# Patient Record
Sex: MALE | Race: ASIAN | NOT HISPANIC OR LATINO | Employment: FULL TIME | URBAN - METROPOLITAN AREA
[De-identification: names, ages, dates, MRNs, and addresses within clinical notes are randomized per-mention and may not be internally consistent; named-entity substitution may affect disease eponyms.]

---

## 2021-12-09 ENCOUNTER — OFFICE VISIT (OUTPATIENT)
Dept: URGENT CARE | Facility: URGENT CARE | Age: 31
End: 2021-12-09
Payer: OTHER GOVERNMENT

## 2021-12-09 ENCOUNTER — ANCILLARY PROCEDURE (OUTPATIENT)
Dept: GENERAL RADIOLOGY | Facility: CLINIC | Age: 31
End: 2021-12-09
Attending: FAMILY MEDICINE
Payer: OTHER GOVERNMENT

## 2021-12-09 VITALS
HEART RATE: 57 BPM | DIASTOLIC BLOOD PRESSURE: 80 MMHG | OXYGEN SATURATION: 99 % | SYSTOLIC BLOOD PRESSURE: 126 MMHG | WEIGHT: 146.2 LBS | TEMPERATURE: 97.9 F

## 2021-12-09 DIAGNOSIS — M25.512 ACUTE PAIN OF LEFT SHOULDER: ICD-10-CM

## 2021-12-09 DIAGNOSIS — S49.92XA INJURY OF LEFT ACROMIOCLAVICULAR JOINT, INITIAL ENCOUNTER: ICD-10-CM

## 2021-12-09 DIAGNOSIS — R07.81 RIB PAIN ON LEFT SIDE: ICD-10-CM

## 2021-12-09 DIAGNOSIS — M25.512 ACUTE PAIN OF LEFT SHOULDER: Primary | ICD-10-CM

## 2021-12-09 PROCEDURE — 71101 X-RAY EXAM UNILAT RIBS/CHEST: CPT | Mod: LT | Performed by: RADIOLOGY

## 2021-12-09 PROCEDURE — 73030 X-RAY EXAM OF SHOULDER: CPT | Mod: LT | Performed by: RADIOLOGY

## 2021-12-09 PROCEDURE — 99204 OFFICE O/P NEW MOD 45 MIN: CPT | Performed by: FAMILY MEDICINE

## 2021-12-09 NOTE — LETTER
[facility] [place]  [address] [place] [address]  Phone: [phone]    December 9, 2021        Chance Wilson  [address] [facility] [address]          To whom it may concern:    RE: Chance Wilson    Patient was seen and treated today at our clinic.  Patient has a left shoulder AC joint injury  Please restrict from activities that require left upper arm strength and stability.  No pushing pulling lifting with the left hand    Restrictions until re-evaluated by orthopedics.     Please contact me for questions or concerns.      Sincerely,        Kathia Ndiaye MD

## 2021-12-09 NOTE — PROGRESS NOTES
Chief complaint: left shoulder     Last weekend   Snow boarding [place]  Was going fast on the hill and caught an edge   And landed directly on his left shoulder     Then landed on it again    Initially was so bad that he could hardly move it  Had numbness and pain   He could move it limited but certain things still hurt     Getting a bit better     head injury:No  loss of consciousness:  No  syncope or presyncope: No  chest pain or palpitation: No  mechanical fall:  Yes  using assistive devices:  No  blood thinners: No  denies headache  denies any nausea or vomiting  denies any amnesia, confusion or concussion symptoms  denies any blurring of vision  denies any otorrhea or rhinorhea  denies any neck pain  denies any back pain.  denies any joint pain except noted above.  denies any bowel or bladder incontinence or motor or sensory deficits.  denies any abdominal pain, nausea or vomiting or flank pain  denies any hematuria         No Known Allergies    No past medical history on file.    No current outpatient medications on file prior to visit.  No current facility-administered medications on file prior to visit.      Social History     Tobacco Use     Smoking status: Not on file     Smokeless tobacco: Not on file   Substance Use Topics     Alcohol use: Not on file     Drug use: Not on file       ROS:  review of systems negative except for noted above.       OBJECTIVE:  /80   Pulse 57   Temp 97.9  F (36.6  C) (Tympanic)   Wt 66.3 kg (146 lb 3.2 oz)   SpO2 99%    General:   awake, alert, and cooperative.  NAD.   Head: Normocephalic, atraumatic.  Eyes: Conjunctiva clear,   Heart: Regular rate and rhythm. No murmur.  Lungs: Chest is clear; no wheezes or rales.   Abdomen: soft non-tender.  Neuro: Alert and oriented - normal speech.  MS: Using extremities freely  No erythema warmth or swelling  AC joint tenderness: positive left   Bicipital groove tenderness: negative   Speed's test: negative   Cross arm test:  POSITIVE   Active range of motion: LIMITED WITH PAIN ABDUCTION ABOVE SHOULDER LEVEL AND FORWARD FLEXION ABOVE SHOULDER LEVEL   Passive range of motion: INTACT   Motor intact sensory intact.   PSYCH:  Normal affect, normal speech  SKIN: no obvious rashes    Diagnostic Test Results:  No results found for this or any previous visit (from the past 24 hour(s)).      ASSESSMENT:    ICD-10-CM    1. Acute pain of left shoulder  M25.512 XR Shoulder Left G/E 3 Views     XR Ribs & Chest Lt 3v   2. Rib pain on left side  R07.81 XR Shoulder Left G/E 3 Views     XR Ribs & Chest Lt 3v   3. Injury of left acromioclavicular joint, initial encounter  S49.92XA Orthopedic  Referral       PLAN:   Xrays to my personal review negative. Prelim negative as well for fracture   Chest xray no rib fracture no pneumothorax    Supportive treatment   activiy modification  See AVS  Referred to orthopedics for follow up   Patient declined sling       Advised about symptoms which might herald more serious problems.        Kathia Ndiaye MD

## 2021-12-09 NOTE — PATIENT INSTRUCTIONS
Patient Education     AC Joint Sprain (Adult)    The AC or acromioclavicular joint is at the end of the collar bone, or clavicle, near the shoulder. The AC joint is made of 4 ligaments that hold the collar bone to the shoulder blade, or scapula. With an AC joint sprain, these ligaments may be partly or fully torn. In both cases, this causes pain and swelling at the end of the collar bone. If the ligaments are completely torn, the collar bone will rise up.  AC joint sprains are given a grade depending on whether they are mild, moderate, or severe:    Grade 1. A mild sprain, with minor damage to the ligaments. The collar bone stays in place.    Grade 2. A moderate sprain. The ligaments are partly torn. The collar bone is moved out of place. The injured shoulder may look lower and flatter than the other shoulder.    Grade 3. The most severe kind of sprain. The ligaments are completely torn. The collar bone is no longer joined to the shoulder blade. The collar bone rises up. This creates a bump on top of the shoulder. The ligaments heal in this position, so the bump does not go away. It is possible to have surgery to correct the bump. But normal shoulder function will usually return even without surgery.  An AC sprain will take up to 6 weeks or longer to heal, depending on how severe it is. It is often treated with a sling. Or a sling and an elastic wrap around the chest may be used. Physical therapy may be needed to help the shoulder keep full range of motion. Once healed, you can usually expect full recovery of shoulder function.  Home care    Your provider may prescribe medicines for pain. Or you may use over-the-counter pain medicines. Talk with your provider before taking medicines if you have chronic liver or kidney disease, or have ever had a stomach ulcer or gastrointestinal bleeding.    Make an appointment right away to see your provider or an orthopedic or bone doctor, for further evaluation and treatment of  the injury.    Use the injured area as little as possible. This will help decrease pain and swelling and allow the area to heal.    Place an ice pack over the injured area for 15 minutes. Do this every 4 to 6 hours for the first 24 to 48 hours, or as directed. Keep using ice packs to ease pain and swelling as directed by your provider or the orthopedic doctor.    To make an ice pack, put ice cubes in a plastic bag that seals at the top. Wrap the bag in a clean, thin towel or cloth. Never put ice or an ice pack directly on the skin. The ice pack can be put right on the wrap or sling. As the ice melts, be careful to not get the wrap or sling wet.    A sling alone is often enough. Sometime a sling and a wrap around the chest may be used to help ease pain, if needed. This also helps keep your injured arm from moving. Use these devices as advised until you are seen by your healthcare provider or the orthopedic doctor. Always ask when the wrap should be worn. Always ask when the wrap can be removed.    Wear the sling while awake or as advised, until you are scheduled to see your healthcare provider or an orthopedic doctor. You may remove the sling to sleep. You may remove the sling to bathe. Make sure the sling is comfortable and keeps your arm raised, as advised by the provider. Follow the provider s instructions on how to use the sling. Always ask when you should wear the sling. Always ask when the sling can be removed.    Shoulder joints become stiff if they are kept still for too long. Talk with your healthcare provider or the orthopedic doctor about range of motion exercises and possible physical therapy.  Follow-up care  Follow up with your healthcare provider, or as advised. Your provider may refer you to a specialist such as an orthopedic, or bone, doctor.  If X-rays were taken, you will be told of any new findings that may affect your care.  When to seek medical advice  Call your healthcare provider right away if  any of these occur:    Your shoulder looks off-balance    You have increased pain, swelling, or bruising    You have increased pain even after using prescribed pain medicine    You have continuing pain     Your hand or arm becomes cold, blue, numb, or tingly    You have trouble moving your shoulder, wrist, or elbow due to stiffness  Laura last reviewed this educational content on 5/1/2018 2000-2021 [employer]. All rights reserved. This information is not intended as a substitute for professional medical care. Always follow your healthcare professional's instructions.           Patient Education     Rib Contusion or Minor Fracture    A rib contusion is a bruise to one or more rib bones. It may cause pain, tenderness, swelling, and a purplish color to the skin. There may be a sharp pain with each breath. A rib contusion takes anywhere from a few days to a few weeks to heal. A minor rib fracture or break may cause the same symptoms as a rib contusion. The small crack may not be seen on a regular chest X-ray. Treatment for both problems is basically the same.  Home care    You may use over-the-counter pain medicine to control pain, unless another pain medicine was prescribed. If you have chronic liver or kidney disease or ever had a stomach ulcer or GI (gastrointestinal) bleeding, talk with your healthcare provider before using these medicines.    Rest. Don't lift anything heavy or do any activity that causes pain.    Apply an ice pack over the injured area for 15 to 20 minutes every 1 to 2 hours. You should do this for the first 24 to 48 hours. To make an ice pack, put ice cubes in a plastic bag that seals at the top. Wrap the bag in a clean, thin towel or cloth. Never put ice or an ice pack directly on the skin. Continue with ice packs as needed for the relief of pain and swelling.    The first 3 to 4 weeks of healing will be the most painful. If your pain is not under control with the treatment  given, call your healthcare provider. Sometimes a stronger pain medicine may be needed. A nerve block can be done in case of severe pain. It will numb the nerve between the ribs.  Follow-up care  Follow up with your healthcare provider, or as advised.  If X-rays were taken, you will be told of any new findings that may affect your care.  Call 911  Call 911 if you have:    Dizziness, weakness or fainting    Shortness of breath with or without chest discomfort    New or worsening pain  When to seek medical advice  Call your healthcare provider right away if any of these occur:    Fever of 100.4 F (38 C) or higher, or as directed by your healthcare provider    Chills    Stomach pain, vomiting  Zesty last reviewed this educational content on 6/1/2018 2000-2021 [employer]. All rights reserved. This information is not intended as a substitute for professional medical care. Always follow your healthcare professional's instructions.

## 2021-12-16 NOTE — PROGRESS NOTES
SUBJECTIVE:   CC: Chance Wilson is an 31 year old male who presents for preventative health visit.       Patient has been advised of split billing requirements and indicates understanding: Yes  Healthy Habits:     Getting at least 3 servings of Calcium per day:  Yes    Bi-annual eye exam:  Yes    Dental care twice a year:  Yes    Sleep apnea or symptoms of sleep apnea:  None    Diet:  Regular (no restrictions)    Frequency of exercise:  4-5 days/week    Duration of exercise:  Greater than 60 minutes    Taking medications regularly:  Yes    PHQ-2 Total Score: 0    Additional concerns today:  Yes              Today's PHQ-2 Score: No flowsheet data found.    Abuse: Current or Past(Physical, Sexual or Emotional)- No  Do you feel safe in your environment? Yes    Have you ever done Advance Care Planning? (For example, a Health Directive, POLST, or a discussion with a medical provider or your loved ones about your wishes): Yes, patient states has an Advance Care Planning document and will bring a copy to the clinic.    Social History     Tobacco Use     Smoking status: Not on file     Smokeless tobacco: Not on file   Substance Use Topics     Alcohol use: Not on file     If you drink alcohol do you typically have >3 drinks per day or >7 drinks per week? No    No flowsheet data found.    Last PSA: No results found for: PSA    Reviewed orders with patient. Reviewed health maintenance and updated orders accordingly -       Reviewed and updated as needed this visit by clinical staff                Reviewed and updated as needed this visit by Provider                   Review of Systems   Constitutional: Negative for chills and fever.   HENT: Negative for congestion, ear pain, hearing loss and sore throat.    Eyes: Negative for pain and visual disturbance.   Respiratory: Negative for cough and shortness of breath.    Cardiovascular: Negative for chest pain, palpitations and peripheral edema.   Gastrointestinal: Negative for  abdominal pain, constipation, diarrhea, heartburn, hematochezia and nausea.   Genitourinary: Negative for dysuria, frequency, genital sores, hematuria and urgency.   Musculoskeletal: Negative for arthralgias, joint swelling and myalgias.   Skin: Negative for rash.   Neurological: Negative for dizziness, weakness, headaches and paresthesias.   Psychiatric/Behavioral: Negative for mood changes. The patient is not nervous/anxious.          OBJECTIVE:   There were no vitals taken for this visit.    Physical Exam          ASSESSMENT/PLAN:       ICD-10-CM    1. Routine general medical examination at a health care facility  Z00.00    2. Need for vaccination  Z23    3. Encounter for hepatitis C screening test for low risk patient  Z11.59 Hepatitis C Screen Reflex to HCV RNA Quant and Genotype     Hepatitis C Screen Reflex to HCV RNA Quant and Genotype   4. Screening for diabetes mellitus  Z13.1 Comprehensive metabolic panel (BMP + Alb, Alk Phos, ALT, AST, Total. Bili, TP)     Comprehensive metabolic panel (BMP + Alb, Alk Phos, ALT, AST, Total. Bili, TP)   5. CARDIOVASCULAR SCREENING; LDL GOAL LESS THAN 160  Z13.6 Lipid panel reflex to direct LDL Fasting     Lipid panel reflex to direct LDL Fasting   6. Personal history of smoking  Z87.891 Pneumococcal vaccine 23 valent PPSV23  (Pneumovax) [44103]   7. Adopted person  Z02.82    8. Family history unknown  Z78.9        Patient has been advised of split billing requirements and indicates understanding: Yes  COUNSELING:   Reviewed preventive health counseling, as reflected in patient instructions       Regular exercise       Healthy diet/nutrition       Vision screening       Family planning       Consider Hep C screening for all patients one time for ages 18-79 years       HIV screeninx in teen years, 1x in adult years, and at intervals if high risk       Osteoporosis prevention/bone health       One time pneumovax for smokers    There is no height or weight on file to  calculate BMI.         He has no history on file for tobacco use.      Counseling Resources:  ATP IV Guidelines  Pooled Cohorts Equation Calculator  FRAX Risk Assessment  ICSI Preventive Guidelines  Dietary Guidelines for Americans, 2010  USDA's MyPlate  ASA Prophylaxis  Lung CA Screening    Fred Patel MD  [facility]  --------------------------------------------------------------------------------------------------------------------------------------  SUBJECTIVE:  Chance Wilson is a 31 year old male who presents to the clinic today for a routine physical exam.    The patient's last physical was many  years ago.     No results found for: CHOL  No results found for: HDL  No results found for: LDL  No results found for: TRIG  No results found for: CHOLHDLRATIO  The patient's last fasting lipid panel was done many  years ago and the results are unknown to the patient.        The ASCVD Risk score (Ails SHANTEL Jr., et al., 2013) failed to calculate for the following reasons:    The 2013 ASCVD risk score is only valid for ages 40 to 79      Risk Enhancers:  Family history of premature ASCVD- unknown  LDL >159- unknown  Chronic kidney disease- No  Metabolic Syndrome- No  Premature menopause- No  Inflammatory conditions (RA, psoriasis, HIV)- No  SE  Ancestry- No  Triglycerides >174- Unknown      The patient reports that he has never been treated for high blood pressure.    The patient reports that he does not take a daily aspirin.    No results found for: HCVAB  The patient reports that he has not been screened for Hepatitis C    (Screen all baby boomers once per CDC-- the generation born from 1945 through 1965 and per USPTF screen age 19 to 79 especially younger people who have used IV drugs)  He would like to have an Hepatitis C test today    No results found for: HIAGAB  The patient reports that he has not been screened for HIV   (Screen all 15 to 64 years old)  He would not like to have an  HIV test today      Immunization History   Administered Date(s) Administered     Anthrax 06/16/2020, 11/07/2020     COVID-19,PF,Ashok 03/29/2021     FLU 6-35 months 10/19/2012, 11/23/2013, 10/19/2014, 10/24/2015     Flu, Unspecified 10/29/2011, 10/11/2018     [id number] Novel Flu 06/01/2010     HepA-Adult 06/05/2011     HepB-Adult 06/05/2011     Influenza Vaccine IM > 6 months Valent IIV4 (Alfuria,Fluzone) 10/20/2016, 01/11/2020, 10/20/2021     Influenza Vaccine, 6+MO IM (QUADRIVALENT W/PRESERVATIVES) 10/20/2020     MMR 06/01/2010, 08/20/2010     Mantoux Tuberculin Skin Test 05/26/2010     Meningococcal (Menactra ) 05/26/2010     Meningococcal (Menveo ) 03/19/2020     Poliovirus, inactivated (IPV) 05/26/2010     Tdap (Adacel,Boostrix) 05/26/2010, 03/19/2020     Twinrix A/B 06/01/2010, 08/20/2010     Typhoid IM 06/16/2020     The patient's believes that his last tetanus shot was given 1 year(s) ago.   The patient believes that he has not had a Shingrix in the past  The patient believes that he has not had a PPSV23 in the past.  The patient believes that he has not had a PCV13 in the past.  The patient believes that he has had a seasonal flu vaccination this fall or winter.  The patient would like to have a PPSV23      No results found for this or any previous visit.]   The patient denies a family history of colon cancer.  The patient reports that he has not had a colonoscopy.    The patient reports that he does not performs a self testicular exam monthly.  His currently used contraception is as his wife is currently pregnant. He is not interested in a vasectomy in the near future.  The patient is unsure of  a family history of diabetes.  The patient reports that he eats or drinks 3 servings of dairy products per day.  The patient reports that he has dental appointments approximately every 6 months.  The patient denies any visual difficulty.    Do you currently smoke? No, quit in 2015   How many years have you smoked?     How many packs per day did you smoke on average? N/A  (if more than 30 pack year history and the patient is age 55-80 consider ordering an annual low dose radiation lung CT to screen for cancer)  (Do not order if patient has quit more than 15 years ago or has a health condition that limits life expectancy or could not tolerate curative lung surgery)  Are you interested having a lung CT to screen for lung cancer? N/A    If the patient has smoked more that 100 cigarettes, has the patient had an imaging study (US or CT) for an AAA between the ages of 65 and 75? N/A              There is no problem list on file for this patient.      History reviewed. No pertinent surgical history.    Family History   Problem Relation Age of Onset     Cancer No family hx of      Diabetes No family hx of      Coronary Artery Disease No family hx of      Hypertension No family hx of      Obesity No family hx of      Heart Disease No family hx of        Social History     Socioeconomic History     Marital status:      Spouse name: Not on file     Number of children: Not on file     Years of education: Not on file     Highest education level: Not on file   Occupational History     Not on file   Tobacco Use     Smoking status: Never Smoker     Smokeless tobacco: Never Used   Vaping Use     Vaping Use: Every day   Substance and Sexual Activity     Alcohol use: Yes     Drug use: Never     Sexual activity: Yes   Other Topics Concern     Not on file   Social History Narrative     Not on file     Social Determinants of Health     Financial Resource Strain: Not on file   Food Insecurity: Not on file   Transportation Needs: Not on file   Physical Activity: Not on file   Stress: Not on file   Social Connections: Not on file   Intimate Partner Violence: Not on file   Housing Stability: Not on file       No current outpatient medications on file.     ROS:   The patient has had bilateral(ly) subareolar swelling since age 17.  It has become  "painful in the last year or 2.  He is interested in having this treated      PHYSICAL EXAMINATION:  Blood pressure 122/74, pulse 53, temperature 97.1  F (36.2  C), temperature source Tympanic, resp. rate 14, height 1.651 m (5' 5\"), weight 66.2 kg (146 lb), SpO2 100 %.  General appearance - healthy, alert and no distress  Skin - Skin color, texture, turgor normal. No rashes or lesions.  Head - Normocephalic. No masses, lesions, tenderness or abnormalities  Eyes - conjunctivae/corneas clear. PERRL, EOM's intact. Fundi benign  Ears - External ears normal. Canals clear. TM's normal.  Nose/Sinuses - Nares normal. Septum midline. Mucosa normal. No drainage or sinus tenderness.  Oropharynx - Lips, mucosa, and tongue normal. Teeth and gums normal.  Neck - Neck supple. No adenopathy. Thyroid symmetric, normal size,  Lungs - Percussion normal. Good diaphragmatic excursion. Lungs clear  Heart - PMI normal. No lifts, heaves, or thrills. RRR. No murmurs, clicks gallops or rub  CHEST: Subareolar tissue which is  tender  Abdomen - Abdomen soft, non-tender. BS normal. No masses, organomegaly  Extremities - Extremities normal. No deformities, edema, or skin discoloration.  Musculoskeletal - Spine ROM normal. Muscular strength intact.  Peripheral pulses - radial=4/4, femoral=4/4, popliteal=4/4, dorsalis pedis=4/4,  Neuro - Gait normal. Reflexes normal and symmetric. Sensation grossly WNL.  Genitalia - Penis normal. No urethral discharge. Scrotum normal to palpation. No hernia.  Rectal - deferred      No results found for any previous visit.       ASSESSMENT:    ICD-10-CM    1. Routine general medical examination at a health care facility  Z00.00        Well-Adult Physical Exam.  Health Maintenance Due   Topic Date Due     ANNUAL REVIEW OF HM ORDERS  Never done     HIV SCREENING  Never done     HEPATITIS C SCREENING  Never done     COVID-19 Vaccine (2 - Booster for Ashok series) 05/24/2021     Health Maintenance   Topic Date Due     " ANNUAL REVIEW OF  ORDERS  Never done     HIV SCREENING  Never done     HEPATITIS C SCREENING  Never done     COVID-19 Vaccine (2 - Booster for Ashok series) 05/24/2021     PREVENTIVE CARE VISIT  12/17/2022     ADVANCE CARE PLANNING  12/17/2026     DTAP/TDAP/TD IMMUNIZATION (3 - Td or Tdap) 03/19/2030     PHQ-2  Completed     INFLUENZA VACCINE  Completed     MENINGITIS IMMUNIZATION  Completed     HEPATITIS B IMMUNIZATION  Completed     Pneumococcal Vaccine: Pediatrics (0 to 5 Years) and At-Risk Patients (6 to 64 Years)  Aged Out     IPV IMMUNIZATION  Aged Out         HEALTH CARE MAINTENENCE: The recommended screening tests and vaccinatons for this patient have been discussed as above.  The appropriate tests and vaccinations  have been ordered or declined by the patient. Please see the orders in EPIC.The patient specifically declines: COVID booster    Immunization Status:  up to date and documented except for PPSV23 and COVID booster    There is no problem list on file for this patient.       ATP III Guidelines  ICSI Preventive Guidelines    PLAN:   Check a fasting lipid profile  Hepatitis C antibody ordered  HIV testing was discussed but the pt declined  PPSV23 recommended  COVID booster vaccine recommended  Testicular self-exam encouraged  Check a fasting glucose  Discussed calcium intake, vitamins and supplements. Recommended 1000 mg of calcium daily  Sunscreen use was recommended especially in the area of tatoos  Recommended dental exams every 6 months  Follow up in 1 year for the next preventative medical visit        Body mass index is 24.3 kg/m .      (Z87.891) Personal history of smoking  Comment:   Plan: Pneumococcal vaccine 23 valent PPSV23          (Pneumovax) [54972]            (Z02.82) Adopted person  Comment:   Plan:     (Z78.9) Family history unknown  Comment:   Plan:       (N62) Gynecomastia, male  Comment:   Plan: Adult General Surg Referral

## 2021-12-16 NOTE — PATIENT INSTRUCTIONS
Preventive Health Recommendations  Male Ages 26 - 39    Yearly exam:             See your health care provider every year in order to  o   Review health changes.   o   Discuss preventive care.    o   Review your medicines if your doctor has prescribed any.    You should be tested each year for STDs (sexually transmitted diseases), if you re at risk.     After age 35, talk to your provider about cholesterol testing. If you are at risk for heart disease, have your cholesterol tested at least every 5 years.     If you are at risk for diabetes, you should have a diabetes test (fasting glucose).  Shots: Get a flu shot each year. Get a tetanus shot every 10 years.     Nutrition:    Eat at least 5 servings of fruits and vegetables daily.     Eat whole-grain bread, whole-wheat pasta and brown rice instead of white grains and rice.     Get adequate Calcium and Vitamin D.     Lifestyle    Exercise for at least 150 minutes a week (30 minutes a day, 5 days a week). This will help you control your weight and prevent disease.     Limit alcohol to one drink per day.     No smoking.     Wear sunscreen to prevent skin cancer.     See your dentist every six months for an exam and cleaning.     Patient Education     Testicular Self-Exam (JEAN)  Testicular cancer is the most common form of cancer in men between the ages of 15 and 35. Most cases affect men under 55. It usually shows up as a painless lump in the testicle. The good news is that a simple monthly self-exam may help find trouble before it gets serious. When detected early, testicular cancer is almost 100% curable.       Doing your JEAN  Do a JEAN once a month, during or after a warm shower. The warm shower helps the scrotum relax. This makes the exam easier to do. Spend about 3 minutes to 5 minutes feeling for lumps, firm areas, or changes. If you do find a problem, don t panic. Call your healthcare provider and make an appointment.  Check the testicles  Hold your scrotum in  the palm of your hand. Roll each testicle gently between the thumbs and fingers of both hands. Feel for changes in each testicle, one at a time.  Check the epididymis  The epididymis is a raised, rim-like structure responsible for storing sperm . It runs along the top and back of each testicle and often hurts when you press on it. Gently feel each epididymis for changes. A spermatocele is a cyst filled with fluid. It may be felt on or near the epididymis or testicle. Most often, spermatoceles are painless and not cancerous.   Check the vas deferens  The vas deferens is a little tube that runs up from the top of each testicle. A normal vas deferens feels like a firm piece of cooked spaghetti. Feel for changes above each testicle.  Professional screening  If you feel any abnormalities, tell your healthcare provider right away. In addition to doing your own JEAN, you should also see your healthcare provider for regular checkups.  [employer] last reviewed this educational content on 6/1/2019 2000-2021 [employer]. All rights reserved. This information is not intended as a substitute for professional medical care. Always follow your healthcare professional's instructions.

## 2021-12-17 ENCOUNTER — OFFICE VISIT (OUTPATIENT)
Dept: FAMILY MEDICINE | Facility: CLINIC | Age: 31
End: 2021-12-17
Payer: OTHER GOVERNMENT

## 2021-12-17 VITALS
HEART RATE: 53 BPM | SYSTOLIC BLOOD PRESSURE: 122 MMHG | HEIGHT: 65 IN | OXYGEN SATURATION: 100 % | DIASTOLIC BLOOD PRESSURE: 74 MMHG | TEMPERATURE: 97.1 F | WEIGHT: 146 LBS | RESPIRATION RATE: 14 BRPM | BODY MASS INDEX: 24.32 KG/M2

## 2021-12-17 DIAGNOSIS — N62 GYNECOMASTIA, MALE: ICD-10-CM

## 2021-12-17 DIAGNOSIS — Z78.9 FAMILY HISTORY UNKNOWN: ICD-10-CM

## 2021-12-17 DIAGNOSIS — Z00.00 ROUTINE GENERAL MEDICAL EXAMINATION AT A HEALTH CARE FACILITY: Primary | ICD-10-CM

## 2021-12-17 DIAGNOSIS — Z13.1 SCREENING FOR DIABETES MELLITUS: ICD-10-CM

## 2021-12-17 DIAGNOSIS — Z87.891 PERSONAL HISTORY OF SMOKING: ICD-10-CM

## 2021-12-17 DIAGNOSIS — Z02.82 ADOPTED PERSON: ICD-10-CM

## 2021-12-17 DIAGNOSIS — Z11.59 ENCOUNTER FOR HEPATITIS C SCREENING TEST FOR LOW RISK PATIENT: ICD-10-CM

## 2021-12-17 DIAGNOSIS — Z23 NEED FOR VACCINATION: ICD-10-CM

## 2021-12-17 DIAGNOSIS — Z13.6 CARDIOVASCULAR SCREENING; LDL GOAL LESS THAN 160: ICD-10-CM

## 2021-12-17 LAB
ALBUMIN SERPL-MCNC: 4.3 G/DL (ref 3.4–5)
ALP SERPL-CCNC: 78 U/L (ref 40–150)
ALT SERPL W P-5'-P-CCNC: 19 U/L (ref 0–70)
ANION GAP SERPL CALCULATED.3IONS-SCNC: 4 MMOL/L (ref 3–14)
AST SERPL W P-5'-P-CCNC: 14 U/L (ref 0–45)
BILIRUB SERPL-MCNC: 1 MG/DL (ref 0.2–1.3)
BUN SERPL-MCNC: 16 MG/DL (ref 7–30)
CALCIUM SERPL-MCNC: 9.5 MG/DL (ref 8.5–10.1)
CHLORIDE BLD-SCNC: 106 MMOL/L (ref 94–109)
CHOLEST SERPL-MCNC: 155 MG/DL
CO2 SERPL-SCNC: 30 MMOL/L (ref 20–32)
CREAT SERPL-MCNC: 1.18 MG/DL (ref 0.66–1.25)
FASTING STATUS PATIENT QL REPORTED: YES
GFR SERPL CREATININE-BSD FRML MDRD: 83 ML/MIN/1.73M2
GLUCOSE BLD-MCNC: 87 MG/DL (ref 70–99)
HDLC SERPL-MCNC: 46 MG/DL
LDLC SERPL CALC-MCNC: 96 MG/DL
NONHDLC SERPL-MCNC: 109 MG/DL
POTASSIUM BLD-SCNC: 4.3 MMOL/L (ref 3.4–5.3)
PROT SERPL-MCNC: 8 G/DL (ref 6.8–8.8)
SODIUM SERPL-SCNC: 140 MMOL/L (ref 133–144)
TRIGL SERPL-MCNC: 66 MG/DL

## 2021-12-17 PROCEDURE — 86803 HEPATITIS C AB TEST: CPT | Performed by: FAMILY MEDICINE

## 2021-12-17 PROCEDURE — 80053 COMPREHEN METABOLIC PANEL: CPT | Performed by: FAMILY MEDICINE

## 2021-12-17 PROCEDURE — 80061 LIPID PANEL: CPT | Performed by: FAMILY MEDICINE

## 2021-12-17 PROCEDURE — 90732 PPSV23 VACC 2 YRS+ SUBQ/IM: CPT | Performed by: FAMILY MEDICINE

## 2021-12-17 PROCEDURE — 90471 IMMUNIZATION ADMIN: CPT | Performed by: FAMILY MEDICINE

## 2021-12-17 PROCEDURE — 99395 PREV VISIT EST AGE 18-39: CPT | Mod: 25 | Performed by: FAMILY MEDICINE

## 2021-12-17 PROCEDURE — 36415 COLL VENOUS BLD VENIPUNCTURE: CPT | Performed by: FAMILY MEDICINE

## 2021-12-17 ASSESSMENT — ENCOUNTER SYMPTOMS
FEVER: 0
NERVOUS/ANXIOUS: 0
HEADACHES: 0
PARESTHESIAS: 0
EYE PAIN: 0
ABDOMINAL PAIN: 0
CONSTIPATION: 0
MYALGIAS: 0
HEMATURIA: 0
NAUSEA: 0
FREQUENCY: 0
WEAKNESS: 0
DIARRHEA: 0
DIZZINESS: 0
PALPITATIONS: 0
HEARTBURN: 0
CHILLS: 0
JOINT SWELLING: 0
HEMATOCHEZIA: 0
ARTHRALGIAS: 0
SORE THROAT: 0
COUGH: 0
SHORTNESS OF BREATH: 0
DYSURIA: 0

## 2021-12-17 ASSESSMENT — MIFFLIN-ST. JEOR: SCORE: 1554.13

## 2021-12-17 ASSESSMENT — PAIN SCALES - GENERAL: PAINLEVEL: NO PAIN (0)

## 2021-12-17 NOTE — PROGRESS NOTES
ASSESSMENT & PLAN    Chance was seen today for pain.    Diagnoses and all orders for this visit:    Injury of left acromioclavicular joint, initial encounter  -     Orthopedic  Referral      AC sprain, improving by history.  He declined an updated letter, but if needing one for his  service, may contact clinic.  Otherwise, follow-up is open ended.  Questions answered. Discussed signs and symptoms that may indicate more serious issues; the patient was instructed to seek appropriate care if noted. Chance indicates understanding of these issues and agrees with the plan.        See Patient Instructions  Patient Instructions   Injury is consistent with sprain of the left acromioclavicular joint.  Anticipate improvement with time.  May use icing, over-the-counter medication as needed for soreness.  We discussed limiting activities for soreness, though as symptoms improve it is okay to return to activities if comfortable.  We discussed the following guidelines for return to activities: full range of motion of the affected area, full strength of the affected area, and no pain.  Continue with monitoring over the next 1.5-2 weeks.  If persistent issues at the shoulder, and if concerns about upcoming  training/assessment, contact clinic and we can provide a letter regarding the condition and any restrictions that may be required.  Otherwise, follow-up will be as needed.    If you have any further questions for your physician or physician s care team you can call [phone] and use option 3 to leave a voice message. Calls received during business hours will be returned same day.          [facility]      CC: [facility]      -----  Chief Complaint   Patient presents with     Left Shoulder - Pain       SUBJECTIVE  Chance Wilson is a/an 31 year old male who is seen in [facility]  [facility] left shoulder injury. He is feeling better and notes his ROM is increasing.    The patient is seen by themselves.  The patient is Right handed    Onset: 12/4/2021. Fell onto his left shoulder while snowboarding x2. Initially, was unable to move his arm and notes tingling.  Location of Pain: Left superior and superoposterior shoulder.  Worsened by: Reaching above his head with weight and scapular depression.  Better with: no treatment to date  Associated symptoms: clicking deep in the shoulder on occasion    Orthopedic/Surgical history: No  Social History/Occupation: Works as a  and in [employer].    No family history pertinent to patient's problem today.   **  Pain more anterior superior shoulder, has had some in left trap area as well.        REVIEW OF SYSTEMS:  Review of Systems   All other systems reviewed and are negative.        OBJECTIVE:  /64   Wt 66.2 kg (146 lb)   BMI 24.30 kg/m     General: healthy, alert and in no distress  HEENT: no scleral icterus or conjunctival erythema  Skin: no suspicious lesions or rash. No jaundice.  CV: distal perfusion intact   Resp: normal respiratory effort without conversational dyspnea   Psych: normal mood and affect  Gait: normal steady gait with appropriate coordination and balance   Neuro: Normal light sensory exam of  extremity         Left Shoulder exam    ROM:      forward flexion         abduction        internal rotation        external rotation   Grossly full, some pain with full IR  Also some pain with cross body motion    Tender:      acromioclavicular joint    Non Tender: remainder    Strength:      abduction        internal rotation        external rotation   Full, intact    Impingement testing:      Mild pain with Vega       neg (-) empty can       positive (+) crossover       Mild pain with O'kal    Skin:      no visible deformities       well perfused       capillary refill brisk    Sensation:      normal  sensation over shoulder and upper extremity       RADIOLOGY:  I independently visualized and reviewed these images with the patient  No acute bony abnormality noted.      SHOULDER LEFT THREE OR MORE VIEWS   12/9/2021 12:49 PM      HISTORY:  Acute pain of left shoulder. Rib pain on left side.     COMPARISON: None.                                                                      IMPRESSION: Normal bones, joint spaces and alignment. There is no  evidence of fracture.     JANUSZ MARTINEZ MD       Review of prior external note(s) from - previous care  Review of the result(s) of each unique test - imaging  Independent interpretation of a test performed by another physician/other qualified health care professional (not separately reported) - imaging

## 2021-12-17 NOTE — PROGRESS NOTES
I called the patient at [phone] and LM. I left the scheduling number [phone] to call and schedule an appointment with Dr. Kang at [facility].  Kimberly Mccoy,  [facility]

## 2021-12-17 NOTE — LETTER
December 20, 2021      Chance Wilson  [address] [address] [address]        Dear ,    We are writing to inform you of your test results.    I have reviewed the results of the laboratory tests that we recently ordered. All of the lab work performed was normal or considered normal for you. I asked my staff to contact you about the surgery we discussed. Please schedule with Dr Guzmán. You should call specialty scheduling at [phone] to schedule the  appointment.        Resulted Orders   Comprehensive metabolic panel (BMP + Alb, Alk Phos, ALT, AST, Total. Bili, TP)   Result Value Ref Range    Sodium 140 133 - 144 mmol/L    Potassium 4.3 3.4 - 5.3 mmol/L    Chloride 106 94 - 109 mmol/L    Carbon Dioxide (CO2) 30 20 - 32 mmol/L    Anion Gap 4 3 - 14 mmol/L    Urea Nitrogen 16 7 - 30 mg/dL    Creatinine 1.18 0.66 - 1.25 mg/dL    Calcium 9.5 8.5 - 10.1 mg/dL    Glucose 87 70 - 99 mg/dL    Alkaline Phosphatase 78 40 - 150 U/L    AST 14 0 - 45 U/L    ALT 19 0 - 70 U/L    Protein Total 8.0 6.8 - 8.8 g/dL    Albumin 4.3 3.4 - 5.0 g/dL    Bilirubin Total 1.0 0.2 - 1.3 mg/dL    GFR Estimate 83 >60 mL/min/1.73m2      Comment:      As of July 11, 2021, eGFR is calculated by the CKD-EPI creatinine equation, without race adjustment. eGFR can be influenced by muscle mass, exercise, and diet. The reported eGFR is an estimation only and is only applicable if the renal function is stable.   Lipid panel reflex to direct LDL Fasting   Result Value Ref Range    Cholesterol 155 <200 mg/dL    Triglycerides 66 <150 mg/dL    Direct Measure HDL 46 >=40 mg/dL    LDL Cholesterol Calculated 96 <=100 mg/dL    Non HDL Cholesterol 109 <130 mg/dL    Patient Fasting > 8hrs? Yes     Narrative    Cholesterol  Desirable:  <200 mg/dL    Triglycerides  Normal:  Less than 150 mg/dL  Borderline High:  150-199 mg/dL  High:  200-499 mg/dL  Very High:  Greater than or equal to 500 mg/dL    Direct Measure HDL  Female:  Greater than or equal  to 50 mg/dL   Male:  Greater than or equal to 40 mg/dL    LDL Cholesterol  Desirable:  <100mg/dL  Above Desirable:  100-129 mg/dL   Borderline High:  130-159 mg/dL   High:  160-189 mg/dL   Very High:  >= 190 mg/dL    Non HDL Cholesterol  Desirable:  130 mg/dL  Above Desirable:  130-159 mg/dL  Borderline High:  160-189 mg/dL  High:  190-219 mg/dL  Very High:  Greater than or equal to 220 mg/dL   Hepatitis C Screen Reflex to HCV RNA Quant and Genotype   Result Value Ref Range    Hepatitis C Antibody Nonreactive Nonreactive    Narrative    Assay performance characteristics have not been established for newborns, infants, and children.       If you have any questions or concerns, please call the clinic at the number listed above.       Sincerely,      Fred Patel MD

## 2021-12-17 NOTE — NURSING NOTE
Prior to immunization administration, verified patients identity using patient s name and date of birth. Please see Immunization Activity for additional information.     Screening Questionnaire for Adult Immunization    Are you sick today?   No   Do you have allergies to medications, food, a vaccine component or latex?   No   Have you ever had a serious reaction after receiving a vaccination?   No   Do you have a long-term health problem with heart, lung, kidney, or metabolic disease (e.g., diabetes), asthma, a blood disorder, no spleen, complement component deficiency, a cochlear implant, or a spinal fluid leak?  Are you on long-term aspirin therapy?   No   Do you have cancer, leukemia, HIV/AIDS, or any other immune system problem?   No   Do you have a parent, brother, or sister with an immune system problem?   No   In the past 3 months, have you taken medications that affect  your immune system, such as prednisone, other steroids, or anticancer drugs; drugs for the treatment of rheumatoid arthritis, Crohn s disease, or psoriasis; or have you had radiation treatments?   No   Have you had a seizure, or a brain or other nervous system problem?   No   During the past year, have you received a transfusion of blood or blood    products, or been given immune (gamma) globulin or antiviral drug?   No   For women: Are you pregnant or is there a chance you could become       pregnant during the next month?   No   Have you received any vaccinations in the past 4 weeks?   No     Immunization questionnaire answers were all negative.        Per orders of Dr. Patel, injection of Pneumo 23 given by Elin Palacio CMA. Patient instructed to remain in clinic for 15 minutes afterwards, and to report any adverse reaction to me immediately.       Screening performed by Elin Palacio CMA on 12/17/2021 at 11:33 AM.

## 2021-12-17 NOTE — Clinical Note
Please call the patient   Dr Kang does the surgery he needs. He does not come to [place] any longer. He should call to schedule the consult with him at [place]. I put  in the referral. Fred Patel MD

## 2021-12-17 NOTE — NURSING NOTE
"Chief Complaint   Patient presents with     Physical       Initial /74   Pulse 53   Temp 97.1  F (36.2  C) (Tympanic)   Resp 14   Ht 1.651 m (5' 5\")   Wt 66.2 kg (146 lb)   SpO2 100%   BMI 24.30 kg/m   Estimated body mass index is 24.3 kg/m  as calculated from the following:    Height as of this encounter: 1.651 m (5' 5\").    Weight as of this encounter: 66.2 kg (146 lb).  Medication Reconciliation: complete  Elin Palacio CMA  "

## 2021-12-18 ENCOUNTER — OFFICE VISIT (OUTPATIENT)
Dept: ORTHOPEDICS | Facility: CLINIC | Age: 31
End: 2021-12-18
Payer: OTHER GOVERNMENT

## 2021-12-18 VITALS — WEIGHT: 146 LBS | DIASTOLIC BLOOD PRESSURE: 64 MMHG | SYSTOLIC BLOOD PRESSURE: 110 MMHG | BODY MASS INDEX: 24.3 KG/M2

## 2021-12-18 DIAGNOSIS — S49.92XA INJURY OF LEFT ACROMIOCLAVICULAR JOINT, INITIAL ENCOUNTER: ICD-10-CM

## 2021-12-18 PROCEDURE — 99203 OFFICE O/P NEW LOW 30 MIN: CPT | Performed by: PEDIATRICS

## 2021-12-18 ASSESSMENT — PAIN SCALES - GENERAL: PAINLEVEL: MODERATE PAIN (5)

## 2021-12-18 NOTE — LETTER
12/18/2021         RE: Chance Wilson  [address] [facility] [address] [facility] [address]        Dear Colleague,    Thank you for referring your patient, Chance Wilson, to the [facility]. Please see a copy of my visit note below.    ASSESSMENT & PLAN    Chance was seen today for pain.    Diagnoses and all orders for this visit:    Injury of left acromioclavicular joint, initial encounter  -     Orthopedic  Referral      AC sprain, improving by history.  He declined an updated letter, but if needing one for his  service, may contact clinic.  Otherwise, follow-up is open ended.  Questions answered. Discussed signs and symptoms that may indicate more serious issues; the patient was instructed to seek appropriate care if noted. Chance indicates understanding of these issues and agrees with the plan.        See Patient Instructions  Patient Instructions   Injury is consistent with sprain of the left acromioclavicular joint.  Anticipate improvement with time.  May use icing, over-the-counter medication as needed for soreness.  We discussed limiting activities for soreness, though as symptoms improve it is okay to return to activities if comfortable.  We discussed the following guidelines for return to activities: full range of motion of the affected area, full strength of the affected area, and no pain.  Continue with monitoring over the next 1.5-2 weeks.  If persistent issues at the shoulder, and if concerns about upcoming  training/assessment, contact clinic and we can provide a letter regarding the condition and any restrictions that may be required.  Otherwise, follow-up will be as needed.    If you have any further questions for your physician or physician s care team you can call [phone] and use option 3 to leave a voice message. Calls received during business hours will be returned same day.          Gui Amador,   [facility]  [facility]      CC: [facility]      -----  Chief Complaint   Patient presents with     Left Shoulder - Pain       SUBJECTIVE  Chance ANISH Wilson is a/an 31 year old male who is seen in consultation as a referral from [facility] for evaluation of a left shoulder injury. He is feeling better and notes his ROM is increasing.    The patient is seen by themselves.  The patient is Right handed    Onset: 12/4/2021. Fell onto his left shoulder while snowboarding x2. Initially, was unable to move his arm and notes tingling.  Location of Pain: Left superior and superoposterior shoulder.  Worsened by: Reaching above his head with weight and scapular depression.  Better with: no treatment to date  Associated symptoms: clicking deep in the shoulder on occasion    Orthopedic/Surgical history: No  Social History/Occupation: Works as a  and in [employer].    No family history pertinent to patient's problem today.   **  Pain more anterior superior shoulder, has had some in left trap area as well.        REVIEW OF SYSTEMS:  Review of Systems   All other systems reviewed and are negative.        OBJECTIVE:  /64   Wt 66.2 kg (146 lb)   BMI 24.30 kg/m     General: healthy, alert and in no distress  HEENT: no scleral icterus or conjunctival erythema  Skin: no suspicious lesions or rash. No jaundice.  CV: distal perfusion intact   Resp: normal respiratory effort without conversational dyspnea   Psych: normal mood and affect  Gait: normal steady gait with appropriate coordination and balance   Neuro: Normal light sensory exam of  extremity         Left Shoulder exam    ROM:      forward flexion         abduction        internal rotation        external rotation   Grossly full, some pain with full IR  Also some pain with cross body motion    Tender:      acromioclavicular joint    Non Tender: remainder    Strength:      abduction        internal rotation        external rotation    Full, intact    Impingement testing:      Mild pain with Vega       neg (-) empty can       positive (+) crossover       Mild pain with O'kal    Skin:      no visible deformities       well perfused       capillary refill brisk    Sensation:      normal sensation over shoulder and upper extremity       RADIOLOGY:  I independently visualized and reviewed these images with the patient  No acute bony abnormality noted.      SHOULDER LEFT THREE OR MORE VIEWS   12/9/2021 12:49 PM      HISTORY:  Acute pain of left shoulder. Rib pain on left side.     COMPARISON: None.                                                                      IMPRESSION: Normal bones, joint spaces and alignment. There is no  evidence of fracture.     JANUSZ MARTINEZ MD       Review of prior external note(s) from - previous care  Review of the result(s) of each unique test - imaging  Independent interpretation of a test performed by another physician/other qualified health care professional (not separately reported) - imaging           Again, thank you for allowing me to participate in the care of your patient.        Sincerely,        Gui Amador,

## 2021-12-18 NOTE — PATIENT INSTRUCTIONS
Injury is consistent with sprain of the left acromioclavicular joint.  Anticipate improvement with time.  May use icing, over-the-counter medication as needed for soreness.  We discussed limiting activities for soreness, though as symptoms improve it is okay to return to activities if comfortable.  We discussed the following guidelines for return to activities: full range of motion of the affected area, full strength of the affected area, and no pain.  Continue with monitoring over the next 1.5-2 weeks.  If persistent issues at the shoulder, and if concerns about upcoming  training/assessment, contact clinic and we can provide a letter regarding the condition and any restrictions that may be required.  Otherwise, follow-up will be as needed.    If you have any further questions for your physician or physician s care team you can call [phone] and use option 3 to leave a voice message. Calls received during business hours will be returned same day.

## 2021-12-20 LAB — HCV AB SERPL QL IA: NONREACTIVE

## 2021-12-20 NOTE — RESULT ENCOUNTER NOTE
Chance,  I have reviewed the results of the laboratory tests that we recently ordered. All of the lab work performed was normal or considered normal for you. I asked my staff to contact you about the surgery we discussed. Please schedule with Dr Guzmán. You should call specialty scheduling at [phone] to schedule the  appointment.       Sincerely,   Fred Patel MD

## 2022-01-07 ENCOUNTER — TELEPHONE (OUTPATIENT)
Dept: SURGERY | Facility: CLINIC | Age: 32
End: 2022-01-07

## 2022-01-07 ENCOUNTER — OFFICE VISIT (OUTPATIENT)
Dept: SURGERY | Facility: CLINIC | Age: 32
End: 2022-01-07
Attending: FAMILY MEDICINE
Payer: OTHER GOVERNMENT

## 2022-01-07 VITALS
BODY MASS INDEX: 24.3 KG/M2 | HEART RATE: 74 BPM | SYSTOLIC BLOOD PRESSURE: 110 MMHG | DIASTOLIC BLOOD PRESSURE: 64 MMHG | WEIGHT: 146 LBS

## 2022-01-07 DIAGNOSIS — N62 GYNECOMASTIA, MALE: ICD-10-CM

## 2022-01-07 PROCEDURE — 99204 OFFICE O/P NEW MOD 45 MIN: CPT | Performed by: SURGERY

## 2022-01-07 NOTE — TELEPHONE ENCOUNTER
Type of surgery: EXCISION GYNECOMASTIA BILATERAL   CPT 05297  Gynecomastia, male N62    Location of surgery: [facility]  Date and time of surgery: 02/02/2022  Surgeon: ILEANA  Pre-Op Appt Date: 01/26/2022  Post-Op Appt Date: 02/15/2022   Packet sent out: Yes  Pre-cert/Authorization completed:  No   Prior auth required per [facility] website.    An approval from [employer] is not required for this service.    However, additional services beyond these such as some non-emergent inpatient services, adjunctive dental, clinical trials, home health care, hospice and certain mental health services may require an approval from [facility] [employer].  Date: 1-7-22    Gali Wallace  Prior Authorization Dept  [phone]

## 2022-01-07 NOTE — PROGRESS NOTES
Dear Dr. Edwards, Provider Not In  I was asked to see this patient by System, Provider Not In for please see below.  I have seen Chance Wilson and as you know his chief complaint is enlargement of both areas under the nipples. Left is larger.   Patient is adopted.    No antiacids.      HPI:  Patient is a 31 year old male  with complaints   The patient noticed the symptoms about 2 years ago.  Started out more like an itch.    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    Patient has not family history of breast cancer known problems        Review Of Systems  Respiratory: No shortness of breath, dyspnea on exertion, cough, or hemoptysis  Cardiovascular: negative  Gastrointestinal: negative  Endocrine: negative  :  negative    10 Point review of systems all others are negative.   /64   Pulse 74   Wt 66.2 kg (146 lb)   BMI 24.30 kg/m      No past medical history on file.    No past surgical history on file.    Social History     Socioeconomic History     Marital status:      Spouse name: Not on file     Number of children: Not on file     Years of education: Not on file     Highest education level: Not on file   Occupational History     Not on file   Tobacco Use     Smoking status: Never Smoker     Smokeless tobacco: Never Used   Vaping Use     Vaping Use: Every day   Substance and Sexual Activity     Alcohol use: Yes     Drug use: Never     Sexual activity: Yes   Other Topics Concern     Not on file   Social History Narrative     Not on file     Social Determinants of Health     Financial Resource Strain: Not on file   Food Insecurity: Not on file   Transportation Needs: Not on file   Physical Activity: Not on file   Stress: Not on file   Social Connections: Not on file   Intimate Partner Violence: Not on file   Housing Stability: Not on file       No current outpatient medications on file.       Above was reviewed  Physical exam: /64   Pulse 74   Wt 66.2 kg (146 lb)   BMI 24.30 kg/m      Patient able to get up on table without difficulty.   Patient is alert and orientated.   Head eyes, nose and mouth within normal limits.  No supraclavicular or cervical adenopathy palpated.  Thyroid within normal limits.  No carotid bruits auscultated.  Lungs are clear to auscultation  Heart is regular rate and rhythm with no murmur or thrills noted.  No costal vertebral angle tenderness noted.  Abdomen is abdomen is soft without significant tenderness      Skin was warm and pink  Normal Affect  Lower extremity edema is not present.    On the left side retroareolar 2 cm of firmer tissue consistant with breast tissue easily mobile and symmetrical and soft.   Have trouble feeling much on the right side, but do feel a small amount of breast tissue, but is minimal.  But if patient wants this done because it is tender and as the other took 2 years to get very uncomfortable then removal of the sub areolar tissue on both sides will need to be done.   Will send for pathology.       Assessment: bilateral gynecomastia   Plan to do removal of sub areolar tissue bilateral. .    Risks of surgery include damage to nerves, bleeding, infection, damage to  Vessels, recurrence.    Numbness in the area is a possibility and damage to the skin.   Will do in the OR with patient  asleep.     Time spent with the patient with greater that 50% of the time in discussion was 30 minutes.  In discussing the plan.      Manolo Kang MD'

## 2022-01-07 NOTE — PATIENT INSTRUCTIONS
On the left side retroareolar 2 cm of firmer tissue consistant with breast tissue easily mobile and symmetrical and soft.   Have trouble feeling much on the right side, but do feel a small amount of breast tissue, but is minimal.  But if patient wants this done because it is tender and as the other took 2 years to get very uncomfortable then removal of the sub areolar tissue on both sides will need to be done.   Will send for pathology.       Assessment: bilateral gynecomastia   Plan to do removal of sub areolar tissue bilateral. .    Risks of surgery include damage to nerves, bleeding, infection, damage to  Vessels, recurrence.    Numbness in the area is a possibility and damage to the skin.   Will do in the OR with patient  asleep.       SKIN AND SUBCUTANEOUS SURGERY DISCHARGE INSTRUCTIONS  DR. LEANDRO TEJEDA    1. You may resume your regular diet when you feel you are ready to. DO NOT drink alcoholic beverages for 24 hours or while you are taking prescription medication.    2. Limit your activities for the first 48 hours. Gradually, increase them as tolerated. You may use stairs. I encourage you to walk as tolerated.     3. You will have some discomfort at the incision sites. This is expected. This should improve over the next 2-3 days. Ice and pain medication will help with this pain. Use prescribed pain medication as instructed.    4. Bruising and mild swelling is normal after surgery. The area below and around the incision(s) will be hard and elevated. This is normal. I call it the healing ridge. This will resolve slowly over the next several months. If you feel the pain is increasing and cannot explain it by increasing activity please call us at [phone].    5. The dressing will often have some blood on it. You may shower 24 hours after surgery. No baths for 2 weeks after surgery.  Clean gently over incision site. If clear plastic covering or steri-strip comes off and there is still some bleeding or  drainage then cover with gauze or band-aid. If no bleeding, there is no reason to cover site. If you were given an abdominal binder at time of surgery it may be removed after 24 hours after surgery. You may continue to wear it however for comfort. I suggest  you wear an old t-shirt under the abdominal binder for a more comfortable wear.    6. Avoid Aspirin for the first 72 hours after the procedure. This medication may increase the tendency to bleed.    7. Use the following medications (in addition to your normal meds) as shown:  a. Percocet 5 mg 1-2 every 6 hours as needed for severe pain. This contains 325 mg of Tylenol (acetaminophen) per tablet.  Please do not take more than 4 grams of Tylenol (acetaminophen) per day. For example, you may take 1 Percocet and 1 Tylenol, or 2 Percocet and no Tylenol, or 2 Tylenol and no Percocet every 6 hours.  b. Tylenol (acetaminophen) 500 mg every 6 hours as needed for mild pain. Do not take more than 1000 mg every 6 hours. (see above).  c. Motrin (ibuprofen) 200-800 mg every 6 hours as needed for mild to moderate pain. Take with food.     8. Notify Dr. Kang's clinic at [phone] if:    Your discomfort is not relieved by your pain medication.    You have signs of infection such as temperature above 100.5 degrees orally, chills, or increasing daily discomfort.    Incision site is becoming more red and/or there is purulent drainage.    You have questions or concerns.    9. Please call [phone] to schedule a follow up appointment in about 2 weeks.  If you have not already been given one.     10. When taking narcotics (pain medication more than Tylenol [acetaminophen] and Motrin [ibuprofen]) it is important to keep your stools soft to avoid constipation and pain with straining. This is best done by drinking fluids (non-alcoholic and non-caffeinated) and taking a stool softener (i.e. Metamucil or milk of magnesia). You may be able to use non-narcotics for pain relief  especially by the 3rd post- operative day. Tylenol (acetaminophen) 500 mg every 6 hours and/or Motrin (ibuprofen) 200-800 mg every 6 hours. Please do not take more than 4 grams of Tylenol (acetaminophen) per day. Remember your Percocet does have Tylenol (acetaminophen) already in it. Please take Motrin (ibuprofen) with food to help protect the stomach. If you have a history of stomach ulcers or stomach problems, do not take Motrin (ibuprofen).     11. Do not drive or operate heavy machinery for 24 hours after surgery or when taking narcotics. You may resume driving when feel that you can safely avoid an accident and are not taking narcotics. This is usually 5 to 7 days after surgery. You should not be alone for 24 hours after surgery.    12. Have milk of magnesia available at home so that when you take the pain medications you take 1-2 teaspoons a day,  to help reduce problems with constipation.      13. If you have questions after your procedure/surgery please contact Dr Kang's primary clinic in [place]. You can call [phone], your other option is to send us a [employer] message through your [facility] portal to Dr Kang's team. For urgent and non urgent matters these options are best. If your symptoms are emergent or cannot wait, please proceed to [facility] and let them know who you had surgery with.

## 2022-01-07 NOTE — LETTER
1/7/2022         RE: Chance Wilson  [address] [facility] [address]        Dear Colleague,    Thank you for referring your patient, Chance Wilson, to the [facility]. Please see a copy of my visit note below.    Dear Dr. Edwards, Provider Not In  I was asked to see this patient by System, Provider Not In for please see below.  I have seen Chance Wilson and as you know his chief complaint is enlargement of both areas under the nipples. Left is larger.   Patient is adopted.    No antiacids.      HPI:  Patient is a 31 year old male  with complaints   The patient noticed the symptoms about 2 years ago.  Started out more like an itch.    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    Patient has not family history of breast cancer known problems        Review Of Systems  Respiratory: No shortness of breath, dyspnea on exertion, cough, or hemoptysis  Cardiovascular: negative  Gastrointestinal: negative  Endocrine: negative  :  negative    10 Point review of systems all others are negative.   /64   Pulse 74   Wt 66.2 kg (146 lb)   BMI 24.30 kg/m      No past medical history on file.    No past surgical history on file.    Social History     Socioeconomic History     Marital status:      Spouse name: Not on file     Number of children: Not on file     Years of education: Not on file     Highest education level: Not on file   Occupational History     Not on file   Tobacco Use     Smoking status: Never Smoker     Smokeless tobacco: Never Used   Vaping Use     Vaping Use: Every day   Substance and Sexual Activity     Alcohol use: Yes     Drug use: Never     Sexual activity: Yes   Other Topics Concern     Not on file   Social History Narrative     Not on file     Social Determinants of Health     Financial Resource Strain: Not on file   Food Insecurity: Not on file   Transportation Needs: Not on file   Physical Activity: Not on file   Stress: Not on file   Social Connections: Not  on file   Intimate Partner Violence: Not on file   Housing Stability: Not on file       No current outpatient medications on file.       Above was reviewed  Physical exam: /64   Pulse 74   Wt 66.2 kg (146 lb)   BMI 24.30 kg/m     Patient able to get up on table without difficulty.   Patient is alert and orientated.   Head eyes, nose and mouth within normal limits.  No supraclavicular or cervical adenopathy palpated.  Thyroid within normal limits.  No carotid bruits auscultated.  Lungs are clear to auscultation  Heart is regular rate and rhythm with no murmur or thrills noted.  No costal vertebral angle tenderness noted.  Abdomen is abdomen is soft without significant tenderness      Skin was warm and pink  Normal Affect  Lower extremity edema is not present.    On the left side retroareolar 2 cm of firmer tissue consistant with breast tissue easily mobile and symmetrical and soft.   Have trouble feeling much on the right side, but do feel a small amount of breast tissue, but is minimal.  But if patient wants this done because it is tender and as the other took 2 years to get very uncomfortable then removal of the sub areolar tissue on both sides will need to be done.   Will send for pathology.       Assessment: bilateral gynecomastia   Plan to do removal of sub areolar tissue bilateral. .    Risks of surgery include damage to nerves, bleeding, infection, damage to  Vessels, recurrence.    Numbness in the area is a possibility and damage to the skin.   Will do in the OR with patient  asleep.     Time spent with the patient with greater that 50% of the time in discussion was 30 minutes.  In discussing the plan.      Manolo Kang MD'      Again, thank you for allowing me to participate in the care of your patient.        Sincerely,        Manolo Kang MD

## 2022-01-24 DIAGNOSIS — Z11.59 ENCOUNTER FOR SCREENING FOR OTHER VIRAL DISEASES: Primary | ICD-10-CM

## 2022-01-26 NOTE — PROGRESS NOTES
[facility]  [address] [address] [address]  Phone: [phone]  Primary Provider: System, Provider Not In  Pre-op Performing Provider: MARYELLEN HOUSTON      PREOPERATIVE EVALUATION:  Today's date: 1/28/2022    Chance Wilson is a 31 year old male who presents for a preoperative evaluation.    Surgical Information:  Surgery/Procedure: EXCISION gynecomastia bilaterally  Surgery Location: [place]  Surgeon: Manolo Kang MD  Surgery Date: 2/2/2022  Time of Surgery: 8:30 AM  Where patient plans to recover: At home with family  Fax number for surgical facility: Note does not need to be faxed, will be available electronically in Epic.    Type of Anesthesia Anticipated: General    Assessment & Plan     The proposed surgical procedure is considered LOW risk.      ICD-10-CM    1. Pre-op exam  Z01.818 Hemoglobin   2. Gynecomastia, male  N62            Risks and Recommendations:  The patient has the following additional risks and recommendations for perioperative complications:   - No identified additional risk factors other than previously addressed    Medication Instructions:  Patient is on no chronic medications    RECOMMENDATION:  APPROVAL GIVEN to proceed with proposed procedure, without further diagnostic evaluation.      Subjective     HPI related to upcoming procedure: history of gynecomastia       Preop Questions 1/28/2022   1. Have you ever had a heart attack or stroke? No   2. Have you ever had surgery on your heart or blood vessels, such as a stent placement, a coronary artery bypass, or surgery on an artery in your head, neck, heart, or legs? No   3. Do you have chest pain with activity? No   4. Do you have a history of  heart failure? No   5. Do you currently have a cold, bronchitis or symptoms of other infection? No   6. Do you have a cough, shortness of breath, or wheezing? No   7. Do you or anyone in your family have previous history of blood clots? UNKNOWN -  adopted   8. Do you or does anyone in your family have a serious bleeding problem such as prolonged bleeding following surgeries or cuts? UNKNOWN - adopted   9. Have you ever had problems with anemia or been told to take iron pills? No   10. Have you had any abnormal blood loss such as black, tarry or bloody stools? No   11. Have you ever had a blood transfusion? No   12. Are you willing to have a blood transfusion if it is medically needed before, during, or after your surgery? Yes   13. Have you or any of your relatives ever had problems with anesthesia? UNKNOWN - adopted   14. Do you have sleep apnea, excessive snoring or daytime drowsiness? No   15. Do you have any artifical heart valves or other implanted medical devices like a pacemaker, defibrillator, or continuous glucose monitor? No   16. Do you have artificial joints? No   17. Are you allergic to latex? No     Health Care Directive:  Patient does not have a Health Care Directive or Living Will: Discussed advance care planning with patient; however, patient declined at this time.    Status of Chronic Conditions:  See problem list for active medical problems.  Problems all longstanding and stable, except as noted/documented.  See ROS for pertinent symptoms related to these conditions.      Review of Systems  CONSTITUTIONAL: NEGATIVE for fever, chills, change in weight  INTEGUMENTARY/SKIN: NEGATIVE for worrisome rashes, moles or lesions  EYES: NEGATIVE for vision changes or irritation  ENT/MOUTH: NEGATIVE for ear, mouth and throat problems  RESP: NEGATIVE for significant cough or SOB  CV: NEGATIVE for chest pain, palpitations or peripheral edema  GI: NEGATIVE for nausea, abdominal pain, heartburn, or change in bowel habits  : NEGATIVE for frequency, dysuria, or hematuria  MUSCULOSKELETAL: NEGATIVE for significant arthralgias or myalgia  NEURO: NEGATIVE for weakness, dizziness or paresthesias  ENDOCRINE: NEGATIVE for temperature intolerance, skin/hair  "changes  HEME: NEGATIVE for bleeding problems  PSYCHIATRIC: NEGATIVE for changes in mood or affect    Patient Active Problem List    Diagnosis Date Noted     Gynecomastia, male 01/07/2022     Priority: Medium     Added automatically from request for surgery [id number]       Adopted person 12/17/2021     Priority: Medium     Family history unknown 12/17/2021     Priority: Medium     Personal history of smoking 12/17/2021     Priority: Medium      History reviewed. No pertinent past medical history.  History reviewed. No pertinent surgical history.  No current outpatient medications on file.       No Known Allergies     Social History     Tobacco Use     Smoking status: Never Smoker     Smokeless tobacco: Never Used   Substance Use Topics     Alcohol use: Yes     Family History   Adopted: Yes   Problem Relation Age of Onset     Cancer No family hx of      Diabetes No family hx of      Coronary Artery Disease No family hx of      Hypertension No family hx of      Obesity No family hx of      Heart Disease No family hx of      History   Drug Use Unknown         Objective     /66   Pulse 72   Temp 98  F (36.7  C) (Oral)   Ht 1.638 m (5' 4.5\")   Wt 67.9 kg (149 lb 9.6 oz)   SpO2 98%   BMI 25.28 kg/m      Physical Exam    GENERAL APPEARANCE: healthy, alert and no distress     EYES: EOMI,  PERRL     HENT: ear canals and TM's normal and nose and mouth without ulcers or lesions     NECK: no adenopathy, no asymmetry, masses, or scars and thyroid normal to palpation     RESP: lungs clear to auscultation - no rales, rhonchi or wheezes     CV: regular rates and rhythm, normal S1 S2, no S3 or S4 and no murmur, click or rub     ABDOMEN:  soft, nontender, no HSM or masses and bowel sounds normal     MS: extremities normal- no gross deformities noted, no evidence of inflammation in joints, FROM in all extremities.     SKIN: no suspicious lesions or rashes     NEURO: Normal strength and tone, sensory exam grossly normal, " mentation intact and speech normal     PSYCH: mentation appears normal. and affect normal/bright     LYMPHATICS: No cervical adenopathy    Recent Labs   Lab Test 12/17/21  1151      POTASSIUM 4.3   CR 1.18        Diagnostics:  Labs pending at this time.  Results will be reviewed when available.   No EKG required for low risk surgery (cataract, skin procedure, breast biopsy, etc).    Revised Cardiac Risk Index (RCRI):  The patient has the following serious cardiovascular risks for perioperative complications:   - No serious cardiac risks = 0 points     RCRI Interpretation: 0 points: Class I (very low risk - 0.4% complication rate)           Signed Electronically by: Jaquan El PA-C  Copy of this evaluation report is provided to requesting physician.

## 2022-01-26 NOTE — H&P (VIEW-ONLY)
[facility]  [address] [address] [address]  Phone: [phone]  Primary Provider: System, Provider Not In  Pre-op Performing Provider: MARYELLEN HOUSTON      PREOPERATIVE EVALUATION:  Today's date: 1/28/2022    Chance Wilson is a 31 year old male who presents for a preoperative evaluation.    Surgical Information:  Surgery/Procedure: EXCISION gynecomastia bilaterally  Surgery Location: [place]  Surgeon: Manolo Kang MD  Surgery Date: 2/2/2022  Time of Surgery: 8:30 AM  Where patient plans to recover: At home with family  Fax number for surgical facility: Note does not need to be faxed, will be available electronically in Epic.    Type of Anesthesia Anticipated: General    Assessment & Plan     The proposed surgical procedure is considered LOW risk.      ICD-10-CM    1. Pre-op exam  Z01.818 Hemoglobin   2. Gynecomastia, male  N62            Risks and Recommendations:  The patient has the following additional risks and recommendations for perioperative complications:   - No identified additional risk factors other than previously addressed    Medication Instructions:  Patient is on no chronic medications    RECOMMENDATION:  APPROVAL GIVEN to proceed with proposed procedure, without further diagnostic evaluation.      Subjective     HPI related to upcoming procedure: history of gynecomastia       Preop Questions 1/28/2022   1. Have you ever had a heart attack or stroke? No   2. Have you ever had surgery on your heart or blood vessels, such as a stent placement, a coronary artery bypass, or surgery on an artery in your head, neck, heart, or legs? No   3. Do you have chest pain with activity? No   4. Do you have a history of  heart failure? No   5. Do you currently have a cold, bronchitis or symptoms of other infection? No   6. Do you have a cough, shortness of breath, or wheezing? No   7. Do you or anyone in your family have previous history of blood clots? UNKNOWN -  adopted   8. Do you or does anyone in your family have a serious bleeding problem such as prolonged bleeding following surgeries or cuts? UNKNOWN - adopted   9. Have you ever had problems with anemia or been told to take iron pills? No   10. Have you had any abnormal blood loss such as black, tarry or bloody stools? No   11. Have you ever had a blood transfusion? No   12. Are you willing to have a blood transfusion if it is medically needed before, during, or after your surgery? Yes   13. Have you or any of your relatives ever had problems with anesthesia? UNKNOWN - adopted   14. Do you have sleep apnea, excessive snoring or daytime drowsiness? No   15. Do you have any artifical heart valves or other implanted medical devices like a pacemaker, defibrillator, or continuous glucose monitor? No   16. Do you have artificial joints? No   17. Are you allergic to latex? No     Health Care Directive:  Patient does not have a Health Care Directive or Living Will: Discussed advance care planning with patient; however, patient declined at this time.    Status of Chronic Conditions:  See problem list for active medical problems.  Problems all longstanding and stable, except as noted/documented.  See ROS for pertinent symptoms related to these conditions.      Review of Systems  CONSTITUTIONAL: NEGATIVE for fever, chills, change in weight  INTEGUMENTARY/SKIN: NEGATIVE for worrisome rashes, moles or lesions  EYES: NEGATIVE for vision changes or irritation  ENT/MOUTH: NEGATIVE for ear, mouth and throat problems  RESP: NEGATIVE for significant cough or SOB  CV: NEGATIVE for chest pain, palpitations or peripheral edema  GI: NEGATIVE for nausea, abdominal pain, heartburn, or change in bowel habits  : NEGATIVE for frequency, dysuria, or hematuria  MUSCULOSKELETAL: NEGATIVE for significant arthralgias or myalgia  NEURO: NEGATIVE for weakness, dizziness or paresthesias  ENDOCRINE: NEGATIVE for temperature intolerance, skin/hair  "changes  HEME: NEGATIVE for bleeding problems  PSYCHIATRIC: NEGATIVE for changes in mood or affect    Patient Active Problem List    Diagnosis Date Noted     Gynecomastia, male 01/07/2022     Priority: Medium     Added automatically from request for surgery [id number]       Adopted person 12/17/2021     Priority: Medium     Family history unknown 12/17/2021     Priority: Medium     Personal history of smoking 12/17/2021     Priority: Medium      History reviewed. No pertinent past medical history.  History reviewed. No pertinent surgical history.  No current outpatient medications on file.       No Known Allergies     Social History     Tobacco Use     Smoking status: Never Smoker     Smokeless tobacco: Never Used   Substance Use Topics     Alcohol use: Yes     Family History   Adopted: Yes   Problem Relation Age of Onset     Cancer No family hx of      Diabetes No family hx of      Coronary Artery Disease No family hx of      Hypertension No family hx of      Obesity No family hx of      Heart Disease No family hx of      History   Drug Use Unknown         Objective     /66   Pulse 72   Temp 98  F (36.7  C) (Oral)   Ht 1.638 m (5' 4.5\")   Wt 67.9 kg (149 lb 9.6 oz)   SpO2 98%   BMI 25.28 kg/m      Physical Exam    GENERAL APPEARANCE: healthy, alert and no distress     EYES: EOMI,  PERRL     HENT: ear canals and TM's normal and nose and mouth without ulcers or lesions     NECK: no adenopathy, no asymmetry, masses, or scars and thyroid normal to palpation     RESP: lungs clear to auscultation - no rales, rhonchi or wheezes     CV: regular rates and rhythm, normal S1 S2, no S3 or S4 and no murmur, click or rub     ABDOMEN:  soft, nontender, no HSM or masses and bowel sounds normal     MS: extremities normal- no gross deformities noted, no evidence of inflammation in joints, FROM in all extremities.     SKIN: no suspicious lesions or rashes     NEURO: Normal strength and tone, sensory exam grossly normal, " mentation intact and speech normal     PSYCH: mentation appears normal. and affect normal/bright     LYMPHATICS: No cervical adenopathy    Recent Labs   Lab Test 12/17/21  1151      POTASSIUM 4.3   CR 1.18        Diagnostics:  Labs pending at this time.  Results will be reviewed when available.   No EKG required for low risk surgery (cataract, skin procedure, breast biopsy, etc).    Revised Cardiac Risk Index (RCRI):  The patient has the following serious cardiovascular risks for perioperative complications:   - No serious cardiac risks = 0 points     RCRI Interpretation: 0 points: Class I (very low risk - 0.4% complication rate)           Signed Electronically by: Jaquan El PA-C  Copy of this evaluation report is provided to requesting physician.

## 2022-01-28 ENCOUNTER — OFFICE VISIT (OUTPATIENT)
Dept: FAMILY MEDICINE | Facility: CLINIC | Age: 32
End: 2022-01-28
Payer: OTHER GOVERNMENT

## 2022-01-28 VITALS
DIASTOLIC BLOOD PRESSURE: 66 MMHG | BODY MASS INDEX: 24.93 KG/M2 | HEART RATE: 72 BPM | SYSTOLIC BLOOD PRESSURE: 105 MMHG | OXYGEN SATURATION: 98 % | TEMPERATURE: 98 F | WEIGHT: 149.6 LBS | HEIGHT: 65 IN

## 2022-01-28 DIAGNOSIS — Z01.818 PRE-OP EXAM: Primary | ICD-10-CM

## 2022-01-28 DIAGNOSIS — N62 GYNECOMASTIA, MALE: ICD-10-CM

## 2022-01-28 LAB — HGB BLD-MCNC: 13.8 G/DL (ref 13.3–17.7)

## 2022-01-28 PROCEDURE — 99214 OFFICE O/P EST MOD 30 MIN: CPT | Performed by: PHYSICIAN ASSISTANT

## 2022-01-28 PROCEDURE — 36415 COLL VENOUS BLD VENIPUNCTURE: CPT | Performed by: PHYSICIAN ASSISTANT

## 2022-01-28 PROCEDURE — 85018 HEMOGLOBIN: CPT | Performed by: PHYSICIAN ASSISTANT

## 2022-01-28 ASSESSMENT — MIFFLIN-ST. JEOR: SCORE: 1562.52

## 2022-01-30 ENCOUNTER — LAB (OUTPATIENT)
Dept: LAB | Facility: CLINIC | Age: 32
End: 2022-01-30
Payer: OTHER GOVERNMENT

## 2022-01-30 DIAGNOSIS — Z11.59 ENCOUNTER FOR SCREENING FOR OTHER VIRAL DISEASES: ICD-10-CM

## 2022-01-30 PROCEDURE — U0005 INFEC AGEN DETEC AMPLI PROBE: HCPCS

## 2022-01-30 PROCEDURE — U0003 INFECTIOUS AGENT DETECTION BY NUCLEIC ACID (DNA OR RNA); SEVERE ACUTE RESPIRATORY SYNDROME CORONAVIRUS 2 (SARS-COV-2) (CORONAVIRUS DISEASE [COVID-19]), AMPLIFIED PROBE TECHNIQUE, MAKING USE OF HIGH THROUGHPUT TECHNOLOGIES AS DESCRIBED BY CMS-2020-01-R: HCPCS

## 2022-01-31 LAB — SARS-COV-2 RNA RESP QL NAA+PROBE: NEGATIVE

## 2022-02-01 ENCOUNTER — ANESTHESIA EVENT (OUTPATIENT)
Dept: SURGERY | Facility: AMBULATORY SURGERY CENTER | Age: 32
End: 2022-02-01
Payer: OTHER GOVERNMENT

## 2022-02-02 ENCOUNTER — ANESTHESIA (OUTPATIENT)
Dept: SURGERY | Facility: AMBULATORY SURGERY CENTER | Age: 32
End: 2022-02-02
Payer: OTHER GOVERNMENT

## 2022-02-02 ENCOUNTER — HOSPITAL ENCOUNTER (OUTPATIENT)
Facility: AMBULATORY SURGERY CENTER | Age: 32
End: 2022-02-02
Attending: SURGERY | Admitting: SURGERY
Payer: OTHER GOVERNMENT

## 2022-02-02 VITALS
WEIGHT: 149 LBS | RESPIRATION RATE: 22 BRPM | DIASTOLIC BLOOD PRESSURE: 75 MMHG | TEMPERATURE: 97.8 F | HEART RATE: 79 BPM | SYSTOLIC BLOOD PRESSURE: 142 MMHG | OXYGEN SATURATION: 99 % | BODY MASS INDEX: 25.18 KG/M2

## 2022-02-02 DIAGNOSIS — N62 GYNECOMASTIA, MALE: ICD-10-CM

## 2022-02-02 PROCEDURE — G8907 PT DOC NO EVENTS ON DISCHARG: HCPCS

## 2022-02-02 PROCEDURE — 88305 TISSUE EXAM BY PATHOLOGIST: CPT | Performed by: PATHOLOGY

## 2022-02-02 PROCEDURE — 19300 MASTECTOMY FOR GYNECOMASTIA: CPT | Mod: LT

## 2022-02-02 PROCEDURE — G8916 PT W IV AB GIVEN ON TIME: HCPCS

## 2022-02-02 PROCEDURE — 19300 MASTECTOMY FOR GYNECOMASTIA: CPT | Performed by: SURGERY

## 2022-02-02 RX ORDER — PROPOFOL 10 MG/ML
INJECTION, EMULSION INTRAVENOUS CONTINUOUS PRN
Status: DISCONTINUED | OUTPATIENT
Start: 2022-02-02 | End: 2022-02-02

## 2022-02-02 RX ORDER — SODIUM CHLORIDE, SODIUM LACTATE, POTASSIUM CHLORIDE, CALCIUM CHLORIDE 600; 310; 30; 20 MG/100ML; MG/100ML; MG/100ML; MG/100ML
INJECTION, SOLUTION INTRAVENOUS CONTINUOUS
Status: DISCONTINUED | OUTPATIENT
Start: 2022-02-02 | End: 2022-02-03 | Stop reason: HOSPADM

## 2022-02-02 RX ORDER — PROPOFOL 10 MG/ML
INJECTION, EMULSION INTRAVENOUS PRN
Status: DISCONTINUED | OUTPATIENT
Start: 2022-02-02 | End: 2022-02-02

## 2022-02-02 RX ORDER — CEFAZOLIN SODIUM 2 G/100ML
2 INJECTION, SOLUTION INTRAVENOUS SEE ADMIN INSTRUCTIONS
Status: DISCONTINUED | OUTPATIENT
Start: 2022-02-02 | End: 2022-02-03 | Stop reason: HOSPADM

## 2022-02-02 RX ORDER — FENTANYL CITRATE 50 UG/ML
25 INJECTION, SOLUTION INTRAMUSCULAR; INTRAVENOUS EVERY 5 MIN PRN
Status: DISCONTINUED | OUTPATIENT
Start: 2022-02-02 | End: 2022-02-03 | Stop reason: HOSPADM

## 2022-02-02 RX ORDER — PHENYLEPHRINE HYDROCHLORIDE 10 MG/ML
INJECTION INTRAVENOUS PRN
Status: DISCONTINUED | OUTPATIENT
Start: 2022-02-02 | End: 2022-02-02

## 2022-02-02 RX ORDER — ACETAMINOPHEN 325 MG/1
975 TABLET ORAL ONCE
Status: COMPLETED | OUTPATIENT
Start: 2022-02-02 | End: 2022-02-02

## 2022-02-02 RX ORDER — FENTANYL CITRATE 50 UG/ML
INJECTION, SOLUTION INTRAMUSCULAR; INTRAVENOUS PRN
Status: DISCONTINUED | OUTPATIENT
Start: 2022-02-02 | End: 2022-02-02

## 2022-02-02 RX ORDER — ONDANSETRON 2 MG/ML
INJECTION INTRAMUSCULAR; INTRAVENOUS PRN
Status: DISCONTINUED | OUTPATIENT
Start: 2022-02-02 | End: 2022-02-02

## 2022-02-02 RX ORDER — OXYCODONE HYDROCHLORIDE 5 MG/1
5 TABLET ORAL EVERY 4 HOURS PRN
Status: DISCONTINUED | OUTPATIENT
Start: 2022-02-02 | End: 2022-02-03 | Stop reason: HOSPADM

## 2022-02-02 RX ORDER — MEPERIDINE HYDROCHLORIDE 25 MG/ML
12.5 INJECTION INTRAMUSCULAR; INTRAVENOUS; SUBCUTANEOUS
Status: DISCONTINUED | OUTPATIENT
Start: 2022-02-02 | End: 2022-02-03 | Stop reason: HOSPADM

## 2022-02-02 RX ORDER — HYDROCODONE BITARTRATE AND ACETAMINOPHEN 5; 325 MG/1; MG/1
1 TABLET ORAL EVERY 6 HOURS PRN
Qty: 8 TABLET | Refills: 0 | Status: SHIPPED | OUTPATIENT
Start: 2022-02-02 | End: 2022-02-05

## 2022-02-02 RX ORDER — LIDOCAINE HYDROCHLORIDE 20 MG/ML
INJECTION, SOLUTION INFILTRATION; PERINEURAL PRN
Status: DISCONTINUED | OUTPATIENT
Start: 2022-02-02 | End: 2022-02-02

## 2022-02-02 RX ORDER — FENTANYL CITRATE 50 UG/ML
25 INJECTION, SOLUTION INTRAMUSCULAR; INTRAVENOUS
Status: DISCONTINUED | OUTPATIENT
Start: 2022-02-02 | End: 2022-02-03 | Stop reason: HOSPADM

## 2022-02-02 RX ORDER — BUPIVACAINE HYDROCHLORIDE AND EPINEPHRINE 2.5; 5 MG/ML; UG/ML
INJECTION, SOLUTION INFILTRATION; PERINEURAL PRN
Status: DISCONTINUED | OUTPATIENT
Start: 2022-02-02 | End: 2022-02-02 | Stop reason: HOSPADM

## 2022-02-02 RX ORDER — EPHEDRINE SULFATE 50 MG/ML
INJECTION, SOLUTION INTRAMUSCULAR; INTRAVENOUS; SUBCUTANEOUS PRN
Status: DISCONTINUED | OUTPATIENT
Start: 2022-02-02 | End: 2022-02-02

## 2022-02-02 RX ORDER — CEFAZOLIN SODIUM 2 G/100ML
2 INJECTION, SOLUTION INTRAVENOUS
Status: COMPLETED | OUTPATIENT
Start: 2022-02-02 | End: 2022-02-02

## 2022-02-02 RX ORDER — ONDANSETRON 2 MG/ML
4 INJECTION INTRAMUSCULAR; INTRAVENOUS EVERY 30 MIN PRN
Status: DISCONTINUED | OUTPATIENT
Start: 2022-02-02 | End: 2022-02-03 | Stop reason: HOSPADM

## 2022-02-02 RX ORDER — ONDANSETRON 4 MG/1
4 TABLET, ORALLY DISINTEGRATING ORAL EVERY 30 MIN PRN
Status: DISCONTINUED | OUTPATIENT
Start: 2022-02-02 | End: 2022-02-03 | Stop reason: HOSPADM

## 2022-02-02 RX ADMIN — ONDANSETRON 4 MG: 2 INJECTION INTRAMUSCULAR; INTRAVENOUS at 08:21

## 2022-02-02 RX ADMIN — SODIUM CHLORIDE, SODIUM LACTATE, POTASSIUM CHLORIDE, CALCIUM CHLORIDE: 600; 310; 30; 20 INJECTION, SOLUTION INTRAVENOUS at 07:56

## 2022-02-02 RX ADMIN — PHENYLEPHRINE HYDROCHLORIDE 50 MCG: 10 INJECTION INTRAVENOUS at 08:44

## 2022-02-02 RX ADMIN — FENTANYL CITRATE 25 MCG: 50 INJECTION, SOLUTION INTRAMUSCULAR; INTRAVENOUS at 09:00

## 2022-02-02 RX ADMIN — PHENYLEPHRINE HYDROCHLORIDE 100 MCG: 10 INJECTION INTRAVENOUS at 09:18

## 2022-02-02 RX ADMIN — PHENYLEPHRINE HYDROCHLORIDE 50 MCG: 10 INJECTION INTRAVENOUS at 08:47

## 2022-02-02 RX ADMIN — PROPOFOL 50 MCG/KG/MIN: 10 INJECTION, EMULSION INTRAVENOUS at 08:30

## 2022-02-02 RX ADMIN — PROPOFOL 200 MG: 10 INJECTION, EMULSION INTRAVENOUS at 08:20

## 2022-02-02 RX ADMIN — OXYCODONE HYDROCHLORIDE 5 MG: 5 TABLET ORAL at 10:27

## 2022-02-02 RX ADMIN — EPHEDRINE SULFATE 5 MG: 50 INJECTION, SOLUTION INTRAMUSCULAR; INTRAVENOUS; SUBCUTANEOUS at 09:10

## 2022-02-02 RX ADMIN — ACETAMINOPHEN 975 MG: 325 TABLET ORAL at 07:48

## 2022-02-02 RX ADMIN — PHENYLEPHRINE HYDROCHLORIDE 100 MCG: 10 INJECTION INTRAVENOUS at 09:04

## 2022-02-02 RX ADMIN — EPHEDRINE SULFATE 5 MG: 50 INJECTION, SOLUTION INTRAMUSCULAR; INTRAVENOUS; SUBCUTANEOUS at 09:20

## 2022-02-02 RX ADMIN — FENTANYL CITRATE 100 MCG: 50 INJECTION, SOLUTION INTRAMUSCULAR; INTRAVENOUS at 08:17

## 2022-02-02 RX ADMIN — FENTANYL CITRATE 25 MCG: 50 INJECTION, SOLUTION INTRAMUSCULAR; INTRAVENOUS at 09:42

## 2022-02-02 RX ADMIN — CEFAZOLIN SODIUM 2 G: 2 INJECTION, SOLUTION INTRAVENOUS at 08:15

## 2022-02-02 RX ADMIN — SODIUM CHLORIDE, SODIUM LACTATE, POTASSIUM CHLORIDE, CALCIUM CHLORIDE: 600; 310; 30; 20 INJECTION, SOLUTION INTRAVENOUS at 09:13

## 2022-02-02 RX ADMIN — LIDOCAINE HYDROCHLORIDE 80 MG: 20 INJECTION, SOLUTION INFILTRATION; PERINEURAL at 08:20

## 2022-02-02 RX ADMIN — PHENYLEPHRINE HYDROCHLORIDE 100 MCG: 10 INJECTION INTRAVENOUS at 08:53

## 2022-02-02 ASSESSMENT — LIFESTYLE VARIABLES: TOBACCO_USE: 1

## 2022-02-02 NOTE — ANESTHESIA POSTPROCEDURE EVALUATION
Patient: Chance Wilson    Procedure: Procedure(s):  EXCISION gynecomastia bilaterally       Diagnosis:Gynecomastia, male [N62]  Diagnosis Additional Information: No value filed.    Anesthesia Type:  General    Note:  Disposition: Outpatient   Postop Pain Control: Uneventful            Sign Out: Well controlled pain   PONV: No   Neuro/Psych: Uneventful            Sign Out: Acceptable/Baseline neuro status   Airway/Respiratory: Uneventful            Sign Out: AIRWAY IN SITU/Resp. Support   CV/Hemodynamics: Uneventful            Sign Out: Acceptable CV status   Other NRE: NONE   DID A NON-ROUTINE EVENT OCCUR? No           Last vitals:  Vitals Value Taken Time   /72 02/02/22 1024   Temp 36.6  C (97.8  F) 02/02/22 0956   Pulse 72 02/02/22 1023   Resp 10 02/02/22 1023   SpO2 99 % 02/02/22 1024   Vitals shown include unvalidated device data.    Electronically Signed By: Alok Cunningham MD  February 2, 2022  3:27 PM

## 2022-02-02 NOTE — OP NOTE
OPERATIVE REPORT    February 2, 2022  Preoperative diagnosis: bilateral painful gynecomastia  Postoperative diagnosis:  same with the left being larger than the right side.  Procedure: bilateral excision of painful gynecomastia   Anesthesia:  General anesthesia with 0.25% marcaine placed thru out the procedure.   Blood loss: 6 cc  Specimen: both sent seperately sent  Surgeon : Manolo Kang M.D.  Indications:  Chance Wilson is a 31 year old year old male with painful bilateral gynecomastia.  .  Risks and complications were explained to the patient including bleeding, risk of anenesthesia, infection, recurrance of breast tissue, damage to nerves.    Questions were answered and postoperative instructions were given to patient and any one with the patient.      Procedure:  The patient was brought to the OR, placed in supine position  after given  general anesthesia, the chest was prepped in sterile manner. After a time out, an Incision was made in the usual fashion with a knife  Starting on the left side first  And a circumareloar incision was made on the  inferior edge of the areolar area.  Then sharp dissection was done to separate the breast tissue from the skin and did keep checking thickness of the skin.  Then the breast tissue was  form the rest of the surrounding fat ant the pectoralis fascia.  Then after removal good hemostasis was done.  Then the skin was closed with several interrupted 3-0 Vicryl sutures, and skin was closed with continuous running 4-0 Monocryl, covered with tincture of Benzoin, Steri-Strips and a Tegaderm, along with an abdominal binder and pressure dressing.  The patient did receive antibiotics preoperatively.     The left side was done exactly the same but did get a small defect in the pectoralis fascia so this was closed with interrupted 3- 0 vicryl.       Plan is to discharge him home today.  keep the pressure dressing on for as long as possible..  I will see him  back in approximately 2 weeks.           LEANDRO TEJEDA MD

## 2022-02-02 NOTE — ANESTHESIA CARE TRANSFER NOTE
Patient: Chance Wilson    Procedure: Procedure(s):  EXCISION gynecomastia bilaterally       Diagnosis: Gynecomastia, male [N62]  Diagnosis Additional Information: No value filed.    Anesthesia Type:   General     Note:    Oropharynx: spontaneously breathing  Level of Consciousness: drowsy  Oxygen Supplementation: face mask  Level of Supplemental Oxygen (L/min / FiO2): 8  Independent Airway: airway patency satisfactory and stable  Dentition: dentition unchanged  Vital Signs Stable: post-procedure vital signs reviewed and stable  Report to RN Given: handoff report given  Patient transferred to: PACU    Handoff Report: Identifed the Patient, Identified the Reponsible Provider, Reviewed the pertinent medical history, Discussed the surgical course, Reviewed Intra-OP anesthesia mangement and issues during anesthesia, Set expectations for post-procedure period and Allowed opportunity for questions and acknowledgement of understanding      Vitals:  Vitals Value Taken Time   /53 02/02/22 0956   Temp     Pulse 67 02/02/22 0958   Resp 5 02/02/22 0958   SpO2 100 % 02/02/22 0958   Vitals shown include unvalidated device data.    Electronically Signed By: KENDALL Bridges CRNA  February 2, 2022  9:58 AM

## 2022-02-02 NOTE — ANESTHESIA PREPROCEDURE EVALUATION
Anesthesia Pre-Procedure Evaluation    Patient: Chance Wilson   MRN: [id number] : 1990        Preoperative Diagnosis: Gynecomastia, male [N62]    Procedure : Procedure(s):  EXCISION gynecomastia bilaterally          History reviewed. No pertinent past medical history.   Past Surgical History:   Procedure Laterality Date     VASCULAR SURGERY Left     vein arm/elbow      No Known Allergies   Social History     Tobacco Use     Smoking status: Current Every Day Smoker     Types: Other     Smokeless tobacco: Never Used     Tobacco comment: electronic    Substance Use Topics     Alcohol use: Yes      Wt Readings from Last 1 Encounters:   22 67.9 kg (149 lb 9.6 oz)        Anesthesia Evaluation            ROS/MED HX  ENT/Pulmonary:  - neg pulmonary ROS   (+) tobacco use, Current use,     Neurologic:  - neg neurologic ROS     Cardiovascular:  - neg cardiovascular ROS     METS/Exercise Tolerance:     Hematologic:  - neg hematologic  ROS     Musculoskeletal:  - neg musculoskeletal ROS     GI/Hepatic:  - neg GI/hepatic ROS     Renal/Genitourinary:  - neg Renal ROS     Endo:  - neg endo ROS     Psychiatric/Substance Use:  - neg psychiatric ROS     Infectious Disease:  - neg infectious disease ROS     Malignancy:  - neg malignancy ROS     Other:  - neg other ROS          Physical Exam    Airway  airway exam normal      Mallampati: II   TM distance: > 3 FB   Neck ROM: full   Mouth opening: > 3 cm    Respiratory Devices and Support         Dental  no notable dental history         Cardiovascular          Rhythm and rate: regular and normal     Pulmonary   pulmonary exam normal        breath sounds clear to auscultation           OUTSIDE LABS:  CBC:   Lab Results   Component Value Date    HGB 13.8 2022     BMP:   Lab Results   Component Value Date     2021    POTASSIUM 4.3 2021    CHLORIDE 106 2021    CO2 30 2021    BUN 16 2021    CR 1.18 2021    GLC 87 2021      COAGS: No results found for: PTT, INR, FIBR  POC: No results found for: BGM, HCG, HCGS  HEPATIC:   Lab Results   Component Value Date    ALBUMIN 4.3 12/17/2021    PROTTOTAL 8.0 12/17/2021    ALT 19 12/17/2021    AST 14 12/17/2021    ALKPHOS 78 12/17/2021    BILITOTAL 1.0 12/17/2021     OTHER:   Lab Results   Component Value Date    TESSIE 9.5 12/17/2021       Anesthesia Plan    ASA Status:  2   NPO Status:  NPO Appropriate    Anesthesia Type: General.     - Airway: LMA   Induction: Intravenous.   Maintenance: Balanced.        Consents    Anesthesia Plan(s) and associated risks, benefits, and realistic alternatives discussed. Questions answered and patient/representative(s) expressed understanding.    - Discussed:     - Discussed with:  Patient      - Extended Intubation/Ventilatory Support Discussed: No.      - Patient is DNR/DNI Status: No    Use of blood products discussed: No .     Postoperative Care    Pain management: Oral pain medications, IV analgesics, Multi-modal analgesia.   PONV prophylaxis: Ondansetron (or other 5HT-3), Background Propofol Infusion     Comments:                Alok Cunningham MD

## 2022-02-02 NOTE — DISCHARGE INSTRUCTIONS
SKIN AND SUBCUTANEOUS SURGERY DISCHARGE INSTRUCTIONS  DR. LEANDRO TEJEDA    1. You may resume your regular diet when you feel you are ready to. DO NOT drink alcoholic beverages for 24 hours or while you are taking prescription medication.    2. Limit your activities for the first 48 hours. Gradually, increase them as tolerated. You may use stairs. I encourage you to walk as tolerated. .    3. You will have some discomfort at the incision sites. This is expected. This should improve over the next 2-3 days. Ice and pain medication will help with this pain. Use prescribed pain medication as instructed.    4. Bruising and mild swelling is normal after surgery. The area below and around the incision(s) will be hard and elevated. This is normal. I call it the healing ridge. This will resolve slowly over the next several months. If you feel the pain is increasing and cannot explain it by increasing activity please call us at [phone].    5. The dressing will often have some blood on it. You may shower 24 hours after surgery. No baths for 2 weeks after surgery.  Clean gently over incision site. If clear plastic covering or steri-strip comes off and there is still some bleeding or drainage then cover with gauze or band-aid. If no bleeding, there is no reason to cover site. If you were given an abdominal binder at time of surgery it may be removed after 24 hours after surgery. You may continue to wear it however for comfort. I suggest  you wear an old t-shirt under the abdominal binder for a more comfortable wear.    6. Avoid Aspirin for the first 72 hours after the procedure. This medication may increase the tendency to bleed.    7. Use the following medications (in addition to your normal meds) as shown:  a. Percocet 5 mg 1-2 every 6 hours as needed for severe pain. This contains 325 mg of Tylenol (acetaminophen) per tablet.  Please do not take more than 4 grams of Tylenol (acetaminophen) per day. For example, you  may take 1 Percocet and 1 Tylenol, or 2 Percocet and no Tylenol, or 2 Tylenol and no Percocet every 6 hours.  b. Tylenol (acetaminophen) 500 mg every 6 hours as needed for mild pain. Do not take more than 1000 mg every 6 hours. (see above).  c. Motrin (ibuprofen) 200-800 mg every 6 hours as needed for mild to moderate pain. Take with food.     8. Notify Dr. Kang's clinic at [phone] if:    Your discomfort is not relieved by your pain medication.    You have signs of infection such as temperature above 100.5 degrees orally, chills, or increasing daily discomfort.    Incision site is becoming more red and/or there is purulent drainage.    You have questions or concerns.    9. Please call [phone] to schedule a follow up appointment in about 2 weeks.  If you have not already been given one.     10. When taking narcotics (pain medication more than Tylenol [acetaminophen] and Motrin [ibuprofen]) it is important to keep your stools soft to avoid constipation and pain with straining. This is best done by drinking fluids (non-alcoholic and non-caffeinated) and taking a stool softener (i.e. Metamucil or milk of magnesia). You may be able to use non-narcotics for pain relief especially by the 3rd post- operative day. Tylenol (acetaminophen) 500 mg every 6 hours and/or Motrin (ibuprofen) 200-800 mg every 6 hours. Please do not take more than 4 grams of Tylenol (acetaminophen) per day. Remember your Percocet does have Tylenol (acetaminophen) already in it. Please take Motrin (ibuprofen) with food to help protect the stomach. If you have a history of stomach ulcers or stomach problems, do not take Motrin (ibuprofen).     11. Do not drive or operate heavy machinery for 24 hours after surgery or when taking narcotics. You may resume driving when feel that you can safely avoid an accident and are not taking narcotics. This is usually 5 to 7 days after surgery. You should not be alone for 24 hours after  surgery.    12. Have milk of magnesia available at home so that when you take the pain medications you take 1-2 teaspoons a day,  to help reduce problems with constipation.      13. If you have questions after your procedure/surgery please contact Dr Kang's primary clinic in [place]. You can call [phone], your other option is to send us a [employer] message through your [employer] portal to Dr Kang's team. For urgent and non urgent matters these options are best. If your symptoms are emergent or cannot wait, please proceed to [facility] and let them know who you had surgery with.    [facility]  Same-Day Surgery   Adult Discharge Orders & Instructions   For 24 hours after surgery  1. Get plenty of rest.  A responsible adult must stay with you for at least 24 hours after you leave the hospital.   2. Do not drive or use heavy equipment.  If you have weakness or tingling, don't drive or use heavy equipment until this feeling goes away.  3. Do not drink alcohol.  4. Avoid strenuous or risky activities.  Ask for help when climbing stairs.   5. You may feel lightheaded.  IF so, sit for a few minutes before standing.  Have someone help you get up.   6. If you have nausea (feel sick to your stomach): Drink only clear liquids such as apple juice, ginger ale, broth or 7-Up.  Rest may also help.  Be sure to drink enough fluids.  Move to a regular diet as you feel able.  7. You may have a slight fever. Call the doctor if your fever is over 100 F (37.7 C) (taken under the tongue) or lasts longer than 24 hours.  8. You may have a dry mouth, a sore throat, muscle aches or trouble sleeping.  These should go away after 24 hours.  9. Do not make important or legal decisions.   Call your doctor for any of the followin.  Signs of infection (fever, growing tenderness at the surgery site, a large amount of drainage or bleeding, severe pain, foul-smelling drainage, redness,  swelling).    2. It has been over 8 to 10 hours since surgery and you are still not able to urinate (pass water).    3.  Headache for over 24 hours.

## 2022-02-02 NOTE — ANESTHESIA PROCEDURE NOTES
Airway         Procedure Start/Stop Times: 2/2/2022 8:21 AM and 2/2/2022 8:22 AM  Staff -        CRNA: Ras Diaz APRN CRNA       Performed By: anesthesiologist  Consent for Airway        Urgency: elective  Indications and Patient Condition       Indications for airway management: airway protection       Induction type:intravenous       Mask difficulty assessment: 1 - vent by mask    Final Airway Details       Final airway type: supraglottic airway    Supraglottic Airway Details        Type: LMA       LMA size: 5    Post intubation assessment        Placement verified by: capnometry, equal breath sounds and chest rise        Number of attempts at approach: 1       Number of other approaches attempted: 0       Secured with: plastic tape       Ease of procedure: easy       Dentition: Unchanged and Intact          
Skin normal color for race, warm, dry and intact. No evidence of rash.

## 2022-02-07 LAB
PATH REPORT.COMMENTS IMP SPEC: NORMAL
PATH REPORT.COMMENTS IMP SPEC: NORMAL
PATH REPORT.FINAL DX SPEC: NORMAL
PATH REPORT.GROSS SPEC: NORMAL
PATH REPORT.MICROSCOPIC SPEC OTHER STN: NORMAL
PATH REPORT.RELEVANT HX SPEC: NORMAL
PHOTO IMAGE: NORMAL

## 2022-02-15 ENCOUNTER — OFFICE VISIT (OUTPATIENT)
Dept: SURGERY | Facility: CLINIC | Age: 32
End: 2022-02-15
Payer: OTHER GOVERNMENT

## 2022-02-15 VITALS
HEART RATE: 79 BPM | DIASTOLIC BLOOD PRESSURE: 56 MMHG | SYSTOLIC BLOOD PRESSURE: 106 MMHG | BODY MASS INDEX: 25.18 KG/M2 | WEIGHT: 149 LBS

## 2022-02-15 DIAGNOSIS — N62 GYNECOMASTIA, MALE: Primary | ICD-10-CM

## 2022-02-15 PROCEDURE — 99024 POSTOP FOLLOW-UP VISIT: CPT | Performed by: SURGERY

## 2022-02-15 NOTE — PROGRESS NOTES
Chance is a 31 year old male who is status post bilateral excision of gynecomastia with no chills and no fever.      Patient's  Pain is  improving.  Appetite is  improving.    Wound(s)  Is/are   clean dry and intact with no evidence of infection.      .     Path report shows:    Final Diagnosis   A. LEFT BREAST TISSUE, EXCISION:  - Benign breast parenchyma  - Negative for atypia or malignancy     B. RIGHT BREAST TISSUE, EXCISION:  - Benign breast parenchyma  - Negative for atypia or malignancy         Plan: follow up as needed.  Use antibiotic ointment on wound at night.     Time spent with the patient with greater that 50% of the time in discussion was 5  minutes.  Manolo Kang MD                OPERATIVE REPORT     February 2, 2022  Preoperative diagnosis: bilateral painful gynecomastia  Postoperative diagnosis:  same with the left being larger than the right side.  Procedure: bilateral excision of painful gynecomastia   Anesthesia:  General anesthesia with 0.25% marcaine placed thru out the procedure.   Blood loss: 6 cc  Specimen: both sent seperately sent  Surgeon : Manolo Kang M.D.  Indications:  Chance Wilson is a 31 year old year old male with painful bilateral gynecomastia.  .  Risks and complications were explained to the patient including bleeding, risk of anenesthesia, infection, recurrance of breast tissue, damage to nerves.    Questions were answered and postoperative instructions were given to patient and any one with the patient.       Procedure:  The patient was brought to the OR, placed in supine position  after given  general anesthesia, the chest was prepped in sterile manner. After a time out, an Incision was made in the usual fashion with a knife  Starting on the left side first  And a circumareloar incision was made on the  inferior edge of the areolar area.  Then sharp dissection was done to separate the breast tissue from the skin and did keep checking thickness of the skin.  Then the  breast tissue was  form the rest of the surrounding fat ant the pectoralis fascia.  Then after removal good hemostasis was done.  Then the skin was closed with several interrupted 3-0 Vicryl sutures, and skin was closed with continuous running 4-0 Monocryl, covered with tincture of Benzoin, Steri-Strips and a Tegaderm, along with an abdominal binder and pressure dressing.  The patient did receive antibiotics preoperatively.      The left side was done exactly the same but did get a small defect in the pectoralis fascia so this was closed with interrupted 3- 0 vicryl.       Plan is to discharge him home today.  keep the pressure dressing on for as long as possible..  I will see him back in approximately 2 weeks.           LEANDRO TEJEDA MD

## 2022-02-15 NOTE — LETTER
2/15/2022         RE: Chance Wilson  [address] [facility] [address] [facility] [address]        Dear Colleague,    Thank you for referring your patient, Chance Wilson, to the [facility]. Please see a copy of my visit note below.    Chance is a 31 year old male who is status post bilateral excision of gynecomastia with no chills and no fever.      Patient's  Pain is  improving.  Appetite is  improving.    Wound(s)  Is/are   clean dry and intact with no evidence of infection.      .     Path report shows:    Final Diagnosis   A. LEFT BREAST TISSUE, EXCISION:  - Benign breast parenchyma  - Negative for atypia or malignancy     B. RIGHT BREAST TISSUE, EXCISION:  - Benign breast parenchyma  - Negative for atypia or malignancy         Plan: follow up as needed.  Use antibiotic ointment on wound at night.     Time spent with the patient with greater that 50% of the time in discussion was 5  minutes.  Manolo Kang MD                OPERATIVE REPORT     February 2, 2022  Preoperative diagnosis: bilateral painful gynecomastia  Postoperative diagnosis:  same with the left being larger than the right side.  Procedure: bilateral excision of painful gynecomastia   Anesthesia:  General anesthesia with 0.25% marcaine placed thru out the procedure.   Blood loss: 6 cc  Specimen: both sent seperately sent  Surgeon : Manolo Kang M.D.  Indications:  Chance Wilson is a 31 year old year old male with painful bilateral gynecomastia.  .  Risks and complications were explained to the patient including bleeding, risk of anenesthesia, infection, recurrance of breast tissue, damage to nerves.    Questions were answered and postoperative instructions were given to patient and any one with the patient.       Procedure:  The patient was brought to the OR, placed in supine position  after given  general anesthesia, the chest was prepped in sterile manner. After a time out, an Incision was made in the usual fashion with a knife   Starting on the left side first  And a circumareloar incision was made on the  inferior edge of the areolar area.  Then sharp dissection was done to separate the breast tissue from the skin and did keep checking thickness of the skin.  Then the breast tissue was  form the rest of the surrounding fat ant the pectoralis fascia.  Then after removal good hemostasis was done.  Then the skin was closed with several interrupted 3-0 Vicryl sutures, and skin was closed with continuous running 4-0 Monocryl, covered with tincture of Benzoin, Steri-Strips and a Tegaderm, along with an abdominal binder and pressure dressing.  The patient did receive antibiotics preoperatively.      The left side was done exactly the same but did get a small defect in the pectoralis fascia so this was closed with interrupted 3- 0 vicryl.       Plan is to discharge him home today.  keep the pressure dressing on for as long as possible..  I will see him back in approximately 2 weeks.           MANOLO KANG MD            Again, thank you for allowing me to participate in the care of your patient.        Sincerely,        Manolo Kang MD

## 2022-10-03 ENCOUNTER — HEALTH MAINTENANCE LETTER (OUTPATIENT)
Age: 32
End: 2022-10-03

## 2023-02-11 ENCOUNTER — HEALTH MAINTENANCE LETTER (OUTPATIENT)
Age: 33
End: 2023-02-11

## 2023-12-21 NOTE — INTERVAL H&P NOTE
I have reviewed the surgical (or preoperative) H&P that is linked to this encounter, and examined the patient. There are no significant changes   157

## 2024-03-06 ENCOUNTER — OFFICE VISIT (OUTPATIENT)
Dept: URGENT CARE | Facility: URGENT CARE | Age: 34
End: 2024-03-06
Payer: OTHER GOVERNMENT

## 2024-03-06 VITALS
HEART RATE: 101 BPM | RESPIRATION RATE: 15 BRPM | WEIGHT: 149.6 LBS | DIASTOLIC BLOOD PRESSURE: 85 MMHG | BODY MASS INDEX: 25.28 KG/M2 | OXYGEN SATURATION: 98 % | SYSTOLIC BLOOD PRESSURE: 133 MMHG | TEMPERATURE: 100.4 F

## 2024-03-06 DIAGNOSIS — R50.9 FEBRILE ILLNESS: Primary | ICD-10-CM

## 2024-03-06 DIAGNOSIS — R11.2 NAUSEA AND VOMITING, UNSPECIFIED VOMITING TYPE: ICD-10-CM

## 2024-03-06 LAB
FLUAV AG SPEC QL IA: NEGATIVE
FLUBV AG SPEC QL IA: NEGATIVE

## 2024-03-06 PROCEDURE — 99213 OFFICE O/P EST LOW 20 MIN: CPT | Performed by: FAMILY MEDICINE

## 2024-03-06 PROCEDURE — 87804 INFLUENZA ASSAY W/OPTIC: CPT | Performed by: FAMILY MEDICINE

## 2024-03-06 PROCEDURE — 87635 SARS-COV-2 COVID-19 AMP PRB: CPT | Performed by: FAMILY MEDICINE

## 2024-03-06 RX ORDER — ONDANSETRON 4 MG/1
4 TABLET, ORALLY DISINTEGRATING ORAL EVERY 8 HOURS PRN
Qty: 30 TABLET | Refills: 0 | Status: SHIPPED | OUTPATIENT
Start: 2024-03-06

## 2024-03-06 NOTE — LETTER
March 6, 2024      Chance Wilson  [address] [address] [address]        To Whom It May Concern:    Chance Wilson was seen in our clinic.     Please excuse all missed work.    We are ruling out Covid and influenza. Covid test is still pending at this time.   Based on current findings I expect that patient needs to stay home until 3/10/2024    Patient may return to work on 3/11/2024 as long as his symptoms have improved for 24 hours.     Sincerely,        Kathia Ndiaye MD

## 2024-03-06 NOTE — PROGRESS NOTES
Chief complaint: fever    Last night started having fever  Then started throwing up  Has been unable to keep things down since then     Then had a couple of diarrhea episodes   No sore throat  Joints and muscles  No cough  No chest pain or shortness of breath  No abdominal pain    Bloody Diarrhea: No  Recent antibiotic use:No  Recent travel:No  Known ill contacts or exposure to c.diff:No  Eating any raw or undercooked foods: notsure   Lightheadedness, syncope, or presyncope:No    No chest pain or shortness of breath or neck stiffness    Problem list and histories reviewed & adjusted, as indicated.  Additional history: as documented    Problem list, Medication list, Allergies, and Medical/Social/Surgical histories reviewed in EPIC and updated as appropriate.    ROS:  Constitutional, HEENT, cardiovascular, pulmonary, gi and gu systems are negative, except as otherwise noted.    OBJECTIVE:                                                    /85 (BP Location: Right arm, Cuff Size: Adult Regular)   Pulse 101   Temp 100.4  F (38  C) (Tympanic)   Resp 15   Wt 67.9 kg (149 lb 9.6 oz)   SpO2 98%   BMI 25.28 kg/m    Body mass index is 25.28 kg/m .  Orthostatic: No  GENERAL: healthy, alert and no distress  Eyes: anicteric   ENT: midline nasal septum no congestion. Bilateral TYMPANINC MEMBRANE normal   Mouth: moist buccal mucosa nonhyperemic posterior pharyngeal wall tonsils not enlarged  Neck: supple n cervical lymphadenopathy  NECK: no adenopathy, no asymmetry, masses, or scars and thyroid normal to palpation  RESP: lungs clear to auscultation - no rales, rhonchi or wheezes  CV: regular rate and rhythm, normal S1 S2, no S3 or S4, no murmur, click or rub, no peripheral edema and peripheral pulses strong  ABDOMEN: soft, no  abdominal tenderness,    no hepatosplenomegaly, no masses and bowel sounds normal  MS: no gross musculoskeletal defects noted, no edema  Skin: well hydrated. No rashes. Good capillary refill and  skin turgor.   Neuro: no meningeal signs     Diagnostic Test Results:  Results for orders placed or performed in visit on 03/06/24 (from the past 24 hour(s))   Influenza A & B Antigen - Clinic Collect    Specimen: Nasopharyngeal; Swab   Result Value Ref Range    Influenza A antigen Negative Negative    Influenza B antigen Negative Negative    Narrative    Test results must be correlated with clinical data. If necessary, results should be confirmed by a molecular assay or viral culture.        ASSESSMENT/PLAN:                                                        ICD-10-CM    1. Febrile illness  R50.9 Influenza A & B Antigen - Clinic Collect     ondansetron (ZOFRAN ODT) 4 MG ODT tab     Symptomatic COVID-19 Virus (Coronavirus) by PCR Nose     CANCELED: Influenza A & B Antigen - Clinic Collect      2. Nausea and vomiting, unspecified vomiting type  R11.2 ondansetron (ZOFRAN ODT) 4 MG ODT tab     Symptomatic COVID-19 Virus (Coronavirus) by PCR Nose          Suspect viral  Prescribed with above  Rule out covid  Work note - see restrictions.   Supportive treatment discussed,   Aware to come in right away or go to ER if feeling weak, unable to keep things down, lightheadedness, syncope, presyncope, persistent symptoms  Recommend follow up with primary care provider if no relief in 3 days, sooner if worse  Adverse reactions of medications discussed.  Over the counter medications discussed.   Aware to come back in if with worsening symptoms or if no relief despite treatment plan  Patient voiced understanding and had no further questions.   Alarm signs or symptoms discussed, if present recommend go to ER       MD Kathia Marion MD  [facility] [place]

## 2024-03-07 LAB — SARS-COV-2 RNA RESP QL NAA+PROBE: NEGATIVE

## 2024-03-09 ENCOUNTER — HEALTH MAINTENANCE LETTER (OUTPATIENT)
Age: 34
End: 2024-03-09

## 2025-03-16 ENCOUNTER — HEALTH MAINTENANCE LETTER (OUTPATIENT)
Age: 35
End: 2025-03-16

## (undated) DEVICE — SU VICRYL 0 CT-2 27" UND J270H

## (undated) DEVICE — SU VICRYL 3-0 SH 27" UND J416H

## (undated) DEVICE — NDL 19GA 1.5"

## (undated) DEVICE — PACK MINOR SBA15MIFSE

## (undated) DEVICE — SOL WATER IRRIG 1000ML BOTTLE 07139-09

## (undated) DEVICE — PREP CHLORAPREP 26ML TINTED ORANGE  260815

## (undated) DEVICE — DRSG ABDOMINAL 07 1/2X8" 7197D

## (undated) DEVICE — SUCTION TIP YANKAUER W/O VENT K86

## (undated) DEVICE — DRSG STERI STRIP 1/2X4" R1547

## (undated) DEVICE — DRAPE BREAST/CHEST 29420

## (undated) DEVICE — GLOVE PROTEXIS W/NEU-THERA 7.5  2D73TE75

## (undated) DEVICE — SU MONOCRYL 4-0 PS-2 18" UND Y496G

## (undated) DEVICE — SOL ADH LIQUID BENZOIN SWAB 0.6ML C1544

## (undated) RX ORDER — EPHEDRINE SULFATE 50 MG/ML
INJECTION, SOLUTION INTRAMUSCULAR; INTRAVENOUS; SUBCUTANEOUS
Status: DISPENSED
Start: 2022-02-02

## (undated) RX ORDER — CEFAZOLIN SODIUM 2 G/100ML
INJECTION, SOLUTION INTRAVENOUS
Status: DISPENSED
Start: 2022-02-02

## (undated) RX ORDER — FENTANYL CITRATE 50 UG/ML
INJECTION, SOLUTION INTRAMUSCULAR; INTRAVENOUS
Status: DISPENSED
Start: 2022-02-02

## (undated) RX ORDER — ACETAMINOPHEN 325 MG/1
TABLET ORAL
Status: DISPENSED
Start: 2022-02-02

## (undated) RX ORDER — OXYCODONE HYDROCHLORIDE 5 MG/1
TABLET ORAL
Status: DISPENSED
Start: 2022-02-02

## (undated) RX ORDER — EPINEPHRINE 1 MG/ML
INJECTION, SOLUTION INTRAMUSCULAR; SUBCUTANEOUS
Status: DISPENSED
Start: 2022-02-02

## (undated) RX ORDER — BUPIVACAINE HYDROCHLORIDE 2.5 MG/ML
INJECTION, SOLUTION INFILTRATION; PERINEURAL
Status: DISPENSED
Start: 2022-02-02

## (undated) RX ORDER — ONDANSETRON 2 MG/ML
INJECTION INTRAMUSCULAR; INTRAVENOUS
Status: DISPENSED
Start: 2022-02-02

## (undated) RX ORDER — FENTANYL CITRATE-0.9 % NACL/PF 10 MCG/ML
PLASTIC BAG, INJECTION (ML) INTRAVENOUS
Status: DISPENSED
Start: 2022-02-02